# Patient Record
Sex: FEMALE | Race: WHITE | URBAN - NONMETROPOLITAN AREA
[De-identification: names, ages, dates, MRNs, and addresses within clinical notes are randomized per-mention and may not be internally consistent; named-entity substitution may affect disease eponyms.]

---

## 2020-07-02 ENCOUNTER — OFFICE VISIT (OUTPATIENT)
Age: 25
End: 2020-07-02

## 2020-07-02 NOTE — PROGRESS NOTES
Patient swabbed for COVID-19 using [employer] vendor but was not evaluated inside the clinic. Patient specimen collected in drive through in patients private vehicle due to order present from primary care provider. Patient was not evaluated by flu clinic provider.

## 2020-07-06 LAB
REPORT: NORMAL
SARS-COV-2: NOT DETECTED
THIS TEST SENT TO: NORMAL

## 2022-09-06 ENCOUNTER — HOSPITAL ENCOUNTER (EMERGENCY)
Facility: HOSPITAL | Age: 27
Discharge: HOME | End: 2022-09-06
Admitting: EMERGENCY MEDICINE

## 2022-09-06 VITALS
TEMPERATURE: 98.4 F | HEIGHT: 63 IN | RESPIRATION RATE: 16 BRPM | HEART RATE: 84 BPM | WEIGHT: 118 LBS | BODY MASS INDEX: 20.91 KG/M2 | SYSTOLIC BLOOD PRESSURE: 116 MMHG | OXYGEN SATURATION: 100 % | DIASTOLIC BLOOD PRESSURE: 70 MMHG

## 2022-09-06 DIAGNOSIS — F41.0 ANXIETY ATTACK: Primary | ICD-10-CM

## 2022-09-06 DIAGNOSIS — Z86.59 HISTORY OF DEPRESSION: ICD-10-CM

## 2022-09-06 LAB
B-HCG UR QL: NEGATIVE
EXPIRATION DATE: NORMAL
INTERNAL NEGATIVE CONTROL: NEGATIVE
INTERNAL POSITIVE CONTROL: POSITIVE
Lab: NORMAL

## 2022-09-06 PROCEDURE — 63710000001 ONDANSETRON ODT 4 MG TABLET DISPERSIBLE: Performed by: NURSE PRACTITIONER

## 2022-09-06 PROCEDURE — 81025 URINE PREGNANCY TEST: CPT | Performed by: NURSE PRACTITIONER

## 2022-09-06 PROCEDURE — 99283 EMERGENCY DEPT VISIT LOW MDM: CPT

## 2022-09-06 RX ORDER — BUSPIRONE HYDROCHLORIDE 5 MG/1
5 TABLET ORAL 3 TIMES DAILY
Qty: 30 TABLET | Refills: 0 | Status: SHIPPED | OUTPATIENT
Start: 2022-09-06

## 2022-09-06 RX ORDER — DIPHENHYDRAMINE HCL 25 MG
25 CAPSULE ORAL DAILY
COMMUNITY
End: 2022-10-25

## 2022-09-06 RX ORDER — ACETAMINOPHEN 500 MG
1000 TABLET ORAL ONCE
Status: COMPLETED | OUTPATIENT
Start: 2022-09-06 | End: 2022-09-06

## 2022-09-06 RX ORDER — LORAZEPAM 0.5 MG/1
0.5 TABLET ORAL ONCE
Status: COMPLETED | OUTPATIENT
Start: 2022-09-06 | End: 2022-09-06

## 2022-09-06 RX ORDER — ONDANSETRON 4 MG/1
4 TABLET, ORALLY DISINTEGRATING ORAL ONCE
Status: COMPLETED | OUTPATIENT
Start: 2022-09-06 | End: 2022-09-06

## 2022-09-06 RX ADMIN — ONDANSETRON 4 MG: 4 TABLET, ORALLY DISINTEGRATING ORAL at 16:32

## 2022-09-06 RX ADMIN — ACETAMINOPHEN 1000 MG: 500 TABLET ORAL at 16:32

## 2022-09-06 RX ADMIN — LORAZEPAM 0.5 MG: 0.5 TABLET ORAL at 16:31

## 2022-09-06 NOTE — DISCHARGE INSTRUCTIONS
Follow up with pcp for re-evaluation and who may also need to prescribe anti-depression medication and anxiety medication. List provided.    Decrease life stressors; call for outpatient counseling- [facility] who has outpatient clinic and counseling services [phone]    Follow up with one of the [facility] physician groups below to setup primary care. If you have trouble making an appointment, please call the [facility] Nurse Line at [phone]    [facility] - [place]  [address] [place] [address]  [id number] [phone]    [facility] Internal Medicine - [address] [address], [address], [place], [place] [id number] [phone]    [facility] Family [facility] Internal [facility] - [address] [address], [address], [place], [place] [id number] [phone]     [facility] ([facility]) - [place]  [address], [address], [place], [place] [id number] [phone]    [facility] - [address], [address] [phone]    [facility] Family [facility] - [address], [place] [address] [phone]    [facility] - [place]  [address] [place], [place], [address] [phone]    [facility] - [place]  [address], [address]  [phone]    [facility] - [place]  [address] [address] [phone]    [facility] - [place]  [address] [place] [address], [facility], [place], [place], [address] [phone]        PEDIATRIC:    [facility] Pediatrics - [address] [address], [address], [place], [place] [id number] [phone]
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
## 2022-10-25 ENCOUNTER — APPOINTMENT (OUTPATIENT)
Dept: GENERAL RADIOLOGY | Facility: HOSPITAL | Age: 27
End: 2022-10-25

## 2022-10-25 ENCOUNTER — HOSPITAL ENCOUNTER (EMERGENCY)
Facility: HOSPITAL | Age: 27
Discharge: HOME | End: 2022-10-25
Attending: EMERGENCY MEDICINE | Admitting: EMERGENCY MEDICINE

## 2022-10-25 VITALS
BODY MASS INDEX: 20.2 KG/M2 | SYSTOLIC BLOOD PRESSURE: 107 MMHG | HEART RATE: 64 BPM | HEIGHT: 63 IN | TEMPERATURE: 99 F | DIASTOLIC BLOOD PRESSURE: 70 MMHG | RESPIRATION RATE: 18 BRPM | WEIGHT: 114 LBS | OXYGEN SATURATION: 100 %

## 2022-10-25 DIAGNOSIS — J20.9 ACUTE BRONCHITIS, UNSPECIFIED ORGANISM: Primary | ICD-10-CM

## 2022-10-25 DIAGNOSIS — J45.909 REACTIVE AIRWAY DISEASE WITHOUT COMPLICATION, UNSPECIFIED ASTHMA SEVERITY, UNSPECIFIED WHETHER PERSISTENT: ICD-10-CM

## 2022-10-25 LAB
ANION GAP SERPL CALCULATED.3IONS-SCNC: 12 MMOL/L (ref 5–15)
BASOPHILS # BLD AUTO: 0.05 10*3/MM3 (ref 0–0.2)
BASOPHILS NFR BLD AUTO: 0.8 % (ref 0–1.5)
BUN SERPL-MCNC: 9 MG/DL (ref 6–20)
BUN/CREAT SERPL: 13.8 (ref 7–25)
CALCIUM SPEC-SCNC: 9.2 MG/DL (ref 8.6–10.5)
CHLORIDE SERPL-SCNC: 104 MMOL/L (ref 98–107)
CO2 SERPL-SCNC: 24 MMOL/L (ref 22–29)
CREAT SERPL-MCNC: 0.65 MG/DL (ref 0.57–1)
CRP SERPL-MCNC: <0.3 MG/DL (ref 0–0.5)
D DIMER PPP FEU-MCNC: 0.4 MCGFEU/ML (ref 0–0.5)
DEPRECATED RDW RBC AUTO: 42.1 FL (ref 37–54)
EGFRCR SERPLBLD CKD-EPI 2021: 125.5 ML/MIN/1.73
EOSINOPHIL # BLD AUTO: 0.18 10*3/MM3 (ref 0–0.4)
EOSINOPHIL NFR BLD AUTO: 3 % (ref 0.3–6.2)
ERYTHROCYTE [DISTWIDTH] IN BLOOD BY AUTOMATED COUNT: 12.9 % (ref 12.3–15.4)
GLUCOSE SERPL-MCNC: 89 MG/DL (ref 65–99)
HCG SERPL QL: NEGATIVE
HCT VFR BLD AUTO: 38.9 % (ref 34–46.6)
HGB BLD-MCNC: 13 G/DL (ref 12–15.9)
IMM GRANULOCYTES # BLD AUTO: 0.01 10*3/MM3 (ref 0–0.05)
IMM GRANULOCYTES NFR BLD AUTO: 0.2 % (ref 0–0.5)
LYMPHOCYTES # BLD AUTO: 1.83 10*3/MM3 (ref 0.7–3.1)
LYMPHOCYTES NFR BLD AUTO: 30 % (ref 19.6–45.3)
MCH RBC QN AUTO: 29.5 PG (ref 26.6–33)
MCHC RBC AUTO-ENTMCNC: 33.4 G/DL (ref 31.5–35.7)
MCV RBC AUTO: 88.4 FL (ref 79–97)
MONOCYTES # BLD AUTO: 0.39 10*3/MM3 (ref 0.1–0.9)
MONOCYTES NFR BLD AUTO: 6.4 % (ref 5–12)
NEUTROPHILS NFR BLD AUTO: 3.63 10*3/MM3 (ref 1.7–7)
NEUTROPHILS NFR BLD AUTO: 59.6 % (ref 42.7–76)
NRBC BLD AUTO-RTO: 0 /100 WBC (ref 0–0.2)
NT-PROBNP SERPL-MCNC: 118.3 PG/ML (ref 0–450)
PLATELET # BLD AUTO: 205 10*3/MM3 (ref 140–450)
PMV BLD AUTO: 11 FL (ref 6–12)
POTASSIUM SERPL-SCNC: 3.5 MMOL/L (ref 3.5–5.2)
PROCALCITONIN SERPL-MCNC: 0.03 NG/ML (ref 0–0.25)
RBC # BLD AUTO: 4.4 10*6/MM3 (ref 3.77–5.28)
SARS-COV-2 RNA RESP QL NAA+PROBE: NOT DETECTED
SODIUM SERPL-SCNC: 140 MMOL/L (ref 136–145)
WBC NRBC COR # BLD: 6.09 10*3/MM3 (ref 3.4–10.8)

## 2022-10-25 PROCEDURE — 94664 DEMO&/EVAL PT USE INHALER: CPT

## 2022-10-25 PROCEDURE — 93010 ELECTROCARDIOGRAM REPORT: CPT | Performed by: INTERNAL MEDICINE

## 2022-10-25 PROCEDURE — 85379 FIBRIN DEGRADATION QUANT: CPT | Performed by: EMERGENCY MEDICINE

## 2022-10-25 PROCEDURE — 85025 COMPLETE CBC W/AUTO DIFF WBC: CPT | Performed by: EMERGENCY MEDICINE

## 2022-10-25 PROCEDURE — 84703 CHORIONIC GONADOTROPIN ASSAY: CPT | Performed by: EMERGENCY MEDICINE

## 2022-10-25 PROCEDURE — U0003 INFECTIOUS AGENT DETECTION BY NUCLEIC ACID (DNA OR RNA); SEVERE ACUTE RESPIRATORY SYNDROME CORONAVIRUS 2 (SARS-COV-2) (CORONAVIRUS DISEASE [COVID-19]), AMPLIFIED PROBE TECHNIQUE, MAKING USE OF HIGH THROUGHPUT TECHNOLOGIES AS DESCRIBED BY CMS-2020-01-R: HCPCS | Performed by: EMERGENCY MEDICINE

## 2022-10-25 PROCEDURE — 93005 ELECTROCARDIOGRAM TRACING: CPT

## 2022-10-25 PROCEDURE — 36415 COLL VENOUS BLD VENIPUNCTURE: CPT

## 2022-10-25 PROCEDURE — 96374 THER/PROPH/DIAG INJ IV PUSH: CPT

## 2022-10-25 PROCEDURE — 80048 BASIC METABOLIC PNL TOTAL CA: CPT | Performed by: EMERGENCY MEDICINE

## 2022-10-25 PROCEDURE — 86140 C-REACTIVE PROTEIN: CPT | Performed by: EMERGENCY MEDICINE

## 2022-10-25 PROCEDURE — 83880 ASSAY OF NATRIURETIC PEPTIDE: CPT | Performed by: EMERGENCY MEDICINE

## 2022-10-25 PROCEDURE — 93005 ELECTROCARDIOGRAM TRACING: CPT | Performed by: EMERGENCY MEDICINE

## 2022-10-25 PROCEDURE — 25010000002 METHYLPREDNISOLONE PER 125 MG: Performed by: EMERGENCY MEDICINE

## 2022-10-25 PROCEDURE — 99284 EMERGENCY DEPT VISIT MOD MDM: CPT

## 2022-10-25 PROCEDURE — 94640 AIRWAY INHALATION TREATMENT: CPT

## 2022-10-25 PROCEDURE — 84145 PROCALCITONIN (PCT): CPT | Performed by: EMERGENCY MEDICINE

## 2022-10-25 PROCEDURE — 71045 X-RAY EXAM CHEST 1 VIEW: CPT

## 2022-10-25 PROCEDURE — 94799 UNLISTED PULMONARY SVC/PX: CPT

## 2022-10-25 RX ORDER — METHYLPREDNISOLONE SODIUM SUCCINATE 125 MG/2ML
125 INJECTION, POWDER, LYOPHILIZED, FOR SOLUTION INTRAMUSCULAR; INTRAVENOUS ONCE
Status: COMPLETED | OUTPATIENT
Start: 2022-10-25 | End: 2022-10-25

## 2022-10-25 RX ORDER — ACETAMINOPHEN 500 MG
1000 TABLET ORAL ONCE
Status: COMPLETED | OUTPATIENT
Start: 2022-10-25 | End: 2022-10-25

## 2022-10-25 RX ORDER — ALBUTEROL SULFATE 90 UG/1
2 AEROSOL, METERED RESPIRATORY (INHALATION) EVERY 4 HOURS PRN
Qty: 1 G | Refills: 0 | Status: SHIPPED | OUTPATIENT
Start: 2022-10-25 | End: 2022-10-26 | Stop reason: SDUPTHER

## 2022-10-25 RX ORDER — METHYLPREDNISOLONE 4 MG/1
TABLET ORAL
Qty: 21 TABLET | Refills: 0 | Status: SHIPPED | OUTPATIENT
Start: 2022-10-25 | End: 2022-10-26 | Stop reason: SDUPTHER

## 2022-10-25 RX ORDER — IPRATROPIUM BROMIDE AND ALBUTEROL SULFATE 2.5; .5 MG/3ML; MG/3ML
3 SOLUTION RESPIRATORY (INHALATION) ONCE
Status: COMPLETED | OUTPATIENT
Start: 2022-10-25 | End: 2022-10-25

## 2022-10-25 RX ADMIN — ACETAMINOPHEN 1000 MG: 500 TABLET ORAL at 02:02

## 2022-10-25 RX ADMIN — IPRATROPIUM BROMIDE AND ALBUTEROL SULFATE 3 ML: 2.5; .5 SOLUTION RESPIRATORY (INHALATION) at 01:25

## 2022-10-25 RX ADMIN — METHYLPREDNISOLONE SODIUM SUCCINATE 125 MG: 125 INJECTION, POWDER, FOR SOLUTION INTRAMUSCULAR; INTRAVENOUS at 02:02

## 2022-10-26 LAB
QT INTERVAL: 352 MS
QTC INTERVAL: 432 MS

## 2022-10-26 RX ORDER — METHYLPREDNISOLONE 4 MG/1
TABLET ORAL
Qty: 21 TABLET | Refills: 0 | Status: SHIPPED | OUTPATIENT
Start: 2022-10-26

## 2022-10-26 RX ORDER — ALBUTEROL SULFATE 90 UG/1
2 AEROSOL, METERED RESPIRATORY (INHALATION) EVERY 4 HOURS PRN
Qty: 1 G | Refills: 0 | Status: SHIPPED | OUTPATIENT
Start: 2022-10-26

## 2023-03-21 ENCOUNTER — APPOINTMENT (OUTPATIENT)
Dept: GENERAL RADIOLOGY | Facility: HOSPITAL | Age: 28
End: 2023-03-21
Payer: COMMERCIAL

## 2023-03-21 ENCOUNTER — HOSPITAL ENCOUNTER (EMERGENCY)
Facility: HOSPITAL | Age: 28
Discharge: HOME | End: 2023-03-21
Admitting: EMERGENCY MEDICINE
Payer: COMMERCIAL

## 2023-03-21 VITALS
WEIGHT: 114 LBS | DIASTOLIC BLOOD PRESSURE: 75 MMHG | TEMPERATURE: 98.7 F | OXYGEN SATURATION: 100 % | SYSTOLIC BLOOD PRESSURE: 120 MMHG | HEART RATE: 80 BPM | RESPIRATION RATE: 22 BRPM | HEIGHT: 63 IN | BODY MASS INDEX: 20.2 KG/M2

## 2023-03-21 DIAGNOSIS — J18.9 PNEUMONITIS: ICD-10-CM

## 2023-03-21 DIAGNOSIS — U07.1 COVID-19: Primary | ICD-10-CM

## 2023-03-21 LAB
FLUAV RNA RESP QL NAA+PROBE: NOT DETECTED
FLUBV RNA RESP QL NAA+PROBE: NOT DETECTED
SARS-COV-2 RNA RESP QL NAA+PROBE: DETECTED

## 2023-03-21 PROCEDURE — 93010 ELECTROCARDIOGRAM REPORT: CPT | Performed by: HOSPITALIST

## 2023-03-21 PROCEDURE — 99283 EMERGENCY DEPT VISIT LOW MDM: CPT

## 2023-03-21 PROCEDURE — 71045 X-RAY EXAM CHEST 1 VIEW: CPT

## 2023-03-21 PROCEDURE — 93005 ELECTROCARDIOGRAM TRACING: CPT

## 2023-03-21 PROCEDURE — 87636 SARSCOV2 & INF A&B AMP PRB: CPT

## 2023-03-21 NOTE — ED PROVIDER NOTES
Subjective   History of Present Illness  Patient is a 27-year-old female who presents to the ED today with complaint of cough for the last week, reports over the last 2 days her cough has become worse and she is having headache and body aches with this as well.  Reports she took an at home COVID test which was positive, but faint.  She reports she has history of pneumonia and mainly came to the hospital today to make sure she does not have a pneumonia on top of this.  She is tachycardic at 114 bpm, O2 sat is 100% on room air.  She denies any chest pain, dizziness, palpitations, or syncope.  There is no PMH.    History provided by:  Patient   used: No        Review of Systems   Constitutional: Positive for fatigue. Negative for chills, diaphoresis and fever.   HENT: Negative for congestion, sore throat and trouble swallowing.    Eyes: Negative.    Respiratory: Positive for cough. Negative for shortness of breath and wheezing.    Cardiovascular: Negative for chest pain, palpitations and leg swelling.   Gastrointestinal: Negative for abdominal pain, nausea and vomiting.   Genitourinary: Negative.    Musculoskeletal: Positive for myalgias.   Skin: Negative.    Neurological: Positive for headaches. Negative for dizziness, syncope, weakness and numbness.   Psychiatric/Behavioral: Negative.        No past medical history on file.    No Known Allergies    Past Surgical History:   Procedure Laterality Date   • CHOLECYSTECTOMY         No family history on file.    Social History     Socioeconomic History   • Marital status: Single   Tobacco Use   • Smoking status: Some Days   Vaping Use   • Vaping Use: Every day   Substance and Sexual Activity   • Alcohol use: Never   • Drug use: Yes     Types: Marijuana   • Sexual activity: Defer           Objective   Physical Exam  Vitals and nursing note reviewed.   Constitutional:       General: She is not in acute distress.     Appearance: Normal appearance. She is  not toxic-appearing or diaphoretic.   HENT:      Head: Normocephalic and atraumatic.      Right Ear: External ear normal.      Left Ear: External ear normal.      Nose: Nose normal.   Eyes:      Extraocular Movements: Extraocular movements intact.      Conjunctiva/sclera: Conjunctivae normal.      Pupils: Pupils are equal, round, and reactive to light.   Cardiovascular:      Rate and Rhythm: Regular rhythm. Tachycardia present.      Pulses: Normal pulses.      Heart sounds: Normal heart sounds.   Pulmonary:      Effort: Pulmonary effort is normal. No respiratory distress.      Breath sounds: Normal breath sounds. No stridor. No wheezing, rhonchi or rales.      Comments: Lung sounds clear bilaterally  Musculoskeletal:      Cervical back: Normal range of motion.   Skin:     General: Skin is warm and dry.      Capillary Refill: Capillary refill takes less than 2 seconds.   Neurological:      Mental Status: She is alert and oriented to person, place, and time. Mental status is at baseline.      GCS: GCS eye subscore is 4. GCS verbal subscore is 5. GCS motor subscore is 6.   Psychiatric:         Mood and Affect: Mood normal.         Behavior: Behavior normal.         Thought Content: Thought content normal.         Judgment: Judgment normal.       XR Chest 1 View   Final Result   1. Peribronchial thickening. Hazy medial bibasilar densities may relate   to atelectasis versus pneumonitis.   This report was finalized on 03/21/2023 14:56 by Dr. Jessica Castro MD.        Labs Reviewed   COVID-19 AND FLU A/B, NP SWAB IN TRANSPORT MEDIA 8-12 HR TAT - Abnormal; Notable for the following components:       Result Value    COVID19 Detected (*)     All other components within normal limits    Narrative:     Fact sheet for providers: https://www.fda.gov/media/048024/download    Fact sheet for patients: https://www.fda.gov/media/385626/download    Test performed by PCR.   COVID PRE-OP / PRE-PROCEDURE SCREENING ORDER (NO ISOLATION)     Narrative:     The following orders were created for panel order COVID PRE-OP / PRE-PROCEDURE SCREENING ORDER (NO ISOLATION) - Swab, Nasopharynx.  Procedure                               Abnormality         Status                     ---------                               -----------         ------                     COVID-19 and FLU A/B PCR...[[id number]]  Abnormal            Final result                 Please view results for these tests on the individual orders.       Procedures           ED Course  ED Course as of 03/21/23 2308   Tue Mar 21, 2023   1531 EKG shows NSR 72 bpm. [HE]      ED Course User Index  [HE] Dory Faustin APRN                                           Medical Decision Making  Patient is a 27-year-old female who presents to the ED today with complaint of cough for the last week, reports over the last 2 days her cough has become worse and she is having headache and body aches with this as well.  Reports she took an at home COVID test which was positive, but faint.  She reports she has history of pneumonia and mainly came to the hospital today to make sure she does not have a pneumonia on top of this.  She is tachycardic at 114 bpm, O2 sat is 100% on room air.  She denies any chest pain, dizziness, palpitations, or syncope.  There is no PMH.    CXR reveals peribronchial thickening. Hazy medial bibasilar densities may relate to atelectasis versus pneumonitis.  EKG shows NSR 72bpm.   Covid test is positive, pt not requiring O2, 100% ambulatory O2 sat on room air.   Pt to f/u with her PCP, return precautions given as well as discharge information regarding managing covid at home. Pt agreeable and appreciative. VS reassuring, HR has improved to 80bpm, pt discharged in stable condition.     COVID-19: acute illness or injury  Amount and/or Complexity of Data Reviewed  Radiology: ordered.  ECG/medicine tests: ordered.          Final diagnoses:   COVID-19   Pneumonitis       ED Disposition  ED  Disposition     ED Disposition   Discharge    Condition   Stable    Comment   --             [facility] [address]  [phone]  In 3 days           Medication List      No changes were made to your prescriptions during this visit.          Dory Faustin, APRN  03/21/23 9926

## 2023-03-21 NOTE — DISCHARGE INSTRUCTIONS
Your COVID test is positive today, your chest x-ray does not show any obvious pneumonia but there is inflammation of your lungs.  Please make sure you follow-up with your PCP and return to the ED with any new, worsening, or persistent symptoms especially low oxygen saturation at home.

## 2023-03-23 LAB
QT INTERVAL: 390 MS
QTC INTERVAL: 427 MS

## 2024-04-19 ENCOUNTER — HOSPITAL ENCOUNTER (EMERGENCY)
Facility: HOSPITAL | Age: 29
Discharge: HOME | End: 2024-04-19
Payer: COMMERCIAL

## 2024-04-19 VITALS
BODY MASS INDEX: 22.24 KG/M2 | OXYGEN SATURATION: 98 % | DIASTOLIC BLOOD PRESSURE: 74 MMHG | HEIGHT: 63 IN | HEART RATE: 89 BPM | RESPIRATION RATE: 16 BRPM | SYSTOLIC BLOOD PRESSURE: 114 MMHG | WEIGHT: 125.5 LBS | TEMPERATURE: 98.5 F

## 2024-04-19 DIAGNOSIS — R11.2 NAUSEA VOMITING AND DIARRHEA: Primary | ICD-10-CM

## 2024-04-19 DIAGNOSIS — R19.7 NAUSEA VOMITING AND DIARRHEA: Primary | ICD-10-CM

## 2024-04-19 LAB
ALBUMIN SERPL-MCNC: 4.8 G/DL (ref 3.5–5.2)
ALBUMIN/GLOB SERPL: 1.8 G/DL
ALP SERPL-CCNC: 70 U/L (ref 39–117)
ALT SERPL W P-5'-P-CCNC: 14 U/L (ref 1–33)
ANION GAP SERPL CALCULATED.3IONS-SCNC: 12 MMOL/L (ref 5–15)
AST SERPL-CCNC: 20 U/L (ref 1–32)
BASOPHILS # BLD AUTO: 0.07 10*3/MM3 (ref 0–0.2)
BASOPHILS NFR BLD AUTO: 0.9 % (ref 0–1.5)
BILIRUB SERPL-MCNC: 0.3 MG/DL (ref 0–1.2)
BILIRUB UR QL STRIP: NEGATIVE
BUN SERPL-MCNC: 8 MG/DL (ref 6–20)
BUN/CREAT SERPL: 12.1 (ref 7–25)
CALCIUM SPEC-SCNC: 9.3 MG/DL (ref 8.6–10.5)
CHLORIDE SERPL-SCNC: 105 MMOL/L (ref 98–107)
CLARITY UR: ABNORMAL
CO2 SERPL-SCNC: 23 MMOL/L (ref 22–29)
COLOR UR: YELLOW
CREAT SERPL-MCNC: 0.66 MG/DL (ref 0.57–1)
DEPRECATED RDW RBC AUTO: 40.7 FL (ref 37–54)
EGFRCR SERPLBLD CKD-EPI 2021: 123.5 ML/MIN/1.73
EOSINOPHIL # BLD AUTO: 0.08 10*3/MM3 (ref 0–0.4)
EOSINOPHIL NFR BLD AUTO: 1 % (ref 0.3–6.2)
ERYTHROCYTE [DISTWIDTH] IN BLOOD BY AUTOMATED COUNT: 12.4 % (ref 12.3–15.4)
GLOBULIN UR ELPH-MCNC: 2.6 GM/DL
GLUCOSE SERPL-MCNC: 90 MG/DL (ref 65–99)
GLUCOSE UR STRIP-MCNC: NEGATIVE MG/DL
HCG INTACT+B SERPL-ACNC: <0.1 MIU/ML
HCT VFR BLD AUTO: 41.1 % (ref 34–46.6)
HGB BLD-MCNC: 13.7 G/DL (ref 12–15.9)
HGB UR QL STRIP.AUTO: NEGATIVE
IMM GRANULOCYTES # BLD AUTO: 0.02 10*3/MM3 (ref 0–0.05)
IMM GRANULOCYTES NFR BLD AUTO: 0.3 % (ref 0–0.5)
KETONES UR QL STRIP: ABNORMAL
LEUKOCYTE ESTERASE UR QL STRIP.AUTO: NEGATIVE
LIPASE SERPL-CCNC: 15 U/L (ref 13–60)
LYMPHOCYTES # BLD AUTO: 1.53 10*3/MM3 (ref 0.7–3.1)
LYMPHOCYTES NFR BLD AUTO: 19.3 % (ref 19.6–45.3)
MCH RBC QN AUTO: 29.8 PG (ref 26.6–33)
MCHC RBC AUTO-ENTMCNC: 33.3 G/DL (ref 31.5–35.7)
MCV RBC AUTO: 89.3 FL (ref 79–97)
MONOCYTES # BLD AUTO: 0.38 10*3/MM3 (ref 0.1–0.9)
MONOCYTES NFR BLD AUTO: 4.8 % (ref 5–12)
NEUTROPHILS NFR BLD AUTO: 5.83 10*3/MM3 (ref 1.7–7)
NEUTROPHILS NFR BLD AUTO: 73.7 % (ref 42.7–76)
NITRITE UR QL STRIP: NEGATIVE
NRBC BLD AUTO-RTO: 0 /100 WBC (ref 0–0.2)
PH UR STRIP.AUTO: 6 [PH] (ref 5–8)
PLATELET # BLD AUTO: 214 10*3/MM3 (ref 140–450)
PMV BLD AUTO: 11.1 FL (ref 6–12)
POTASSIUM SERPL-SCNC: 3.7 MMOL/L (ref 3.5–5.2)
PROT SERPL-MCNC: 7.4 G/DL (ref 6–8.5)
PROT UR QL STRIP: ABNORMAL
RBC # BLD AUTO: 4.6 10*6/MM3 (ref 3.77–5.28)
SODIUM SERPL-SCNC: 140 MMOL/L (ref 136–145)
SP GR UR STRIP: 1.02 (ref 1–1.03)
UROBILINOGEN UR QL STRIP: ABNORMAL
WBC NRBC COR # BLD AUTO: 7.91 10*3/MM3 (ref 3.4–10.8)

## 2024-04-19 PROCEDURE — 96376 TX/PRO/DX INJ SAME DRUG ADON: CPT

## 2024-04-19 PROCEDURE — 80053 COMPREHEN METABOLIC PANEL: CPT | Performed by: PHYSICIAN ASSISTANT

## 2024-04-19 PROCEDURE — 25010000002 ONDANSETRON PER 1 MG: Performed by: PHYSICIAN ASSISTANT

## 2024-04-19 PROCEDURE — 85025 COMPLETE CBC W/AUTO DIFF WBC: CPT | Performed by: PHYSICIAN ASSISTANT

## 2024-04-19 PROCEDURE — 25810000003 LACTATED RINGERS SOLUTION: Performed by: PHYSICIAN ASSISTANT

## 2024-04-19 PROCEDURE — 84702 CHORIONIC GONADOTROPIN TEST: CPT | Performed by: PHYSICIAN ASSISTANT

## 2024-04-19 PROCEDURE — 83690 ASSAY OF LIPASE: CPT | Performed by: PHYSICIAN ASSISTANT

## 2024-04-19 PROCEDURE — 96361 HYDRATE IV INFUSION ADD-ON: CPT

## 2024-04-19 PROCEDURE — 96374 THER/PROPH/DIAG INJ IV PUSH: CPT

## 2024-04-19 PROCEDURE — 25810000003 LACTATED RINGERS PER 1000 ML: Performed by: PHYSICIAN ASSISTANT

## 2024-04-19 PROCEDURE — 81003 URINALYSIS AUTO W/O SCOPE: CPT | Performed by: PHYSICIAN ASSISTANT

## 2024-04-19 PROCEDURE — 99283 EMERGENCY DEPT VISIT LOW MDM: CPT

## 2024-04-19 RX ORDER — SODIUM CHLORIDE, SODIUM LACTATE, POTASSIUM CHLORIDE, CALCIUM CHLORIDE 600; 310; 30; 20 MG/100ML; MG/100ML; MG/100ML; MG/100ML
125 INJECTION, SOLUTION INTRAVENOUS CONTINUOUS
Status: DISCONTINUED | OUTPATIENT
Start: 2024-04-19 | End: 2024-04-19 | Stop reason: HOSPADM

## 2024-04-19 RX ORDER — ONDANSETRON 2 MG/ML
4 INJECTION INTRAMUSCULAR; INTRAVENOUS ONCE
Status: COMPLETED | OUTPATIENT
Start: 2024-04-19 | End: 2024-04-19

## 2024-04-19 RX ORDER — ALUMINA, MAGNESIA, AND SIMETHICONE 2400; 2400; 240 MG/30ML; MG/30ML; MG/30ML
5 SUSPENSION ORAL ONCE
Status: COMPLETED | OUTPATIENT
Start: 2024-04-19 | End: 2024-04-19

## 2024-04-19 RX ORDER — DOXYLAMINE SUCCINATE AND PYRIDOXINE HYDROCHLORIDE, DELAYED RELEASE TABLETS 10 MG/10 MG 10; 10 MG/1; MG/1
2 TABLET, DELAYED RELEASE ORAL NIGHTLY PRN
Qty: 12 TABLET | Refills: 0 | Status: SHIPPED | OUTPATIENT
Start: 2024-04-19

## 2024-04-19 RX ORDER — ACETAMINOPHEN 500 MG
1000 TABLET ORAL ONCE
Status: COMPLETED | OUTPATIENT
Start: 2024-04-19 | End: 2024-04-19

## 2024-04-19 RX ORDER — ONDANSETRON 4 MG/1
4 TABLET, ORALLY DISINTEGRATING ORAL EVERY 6 HOURS PRN
Qty: 20 TABLET | Refills: 0 | Status: SHIPPED | OUTPATIENT
Start: 2024-04-19

## 2024-04-19 RX ADMIN — DOXYLAMINE SUCCINATE: 25 TABLET ORAL at 16:17

## 2024-04-19 RX ADMIN — SODIUM CHLORIDE, POTASSIUM CHLORIDE, SODIUM LACTATE AND CALCIUM CHLORIDE 1000 ML: 600; 310; 30; 20 INJECTION, SOLUTION INTRAVENOUS at 15:46

## 2024-04-19 RX ADMIN — SODIUM CHLORIDE, POTASSIUM CHLORIDE, SODIUM LACTATE AND CALCIUM CHLORIDE 1000 ML: 600; 310; 30; 20 INJECTION, SOLUTION INTRAVENOUS at 14:52

## 2024-04-19 RX ADMIN — ALUMINUM HYDROXIDE, MAGNESIUM HYDROXIDE, AND DIMETHICONE 5 ML: 400; 400; 40 SUSPENSION ORAL at 15:46

## 2024-04-19 RX ADMIN — ACETAMINOPHEN 1000 MG: 500 TABLET, FILM COATED ORAL at 15:44

## 2024-04-19 RX ADMIN — SODIUM CHLORIDE, POTASSIUM CHLORIDE, SODIUM LACTATE AND CALCIUM CHLORIDE 125 ML/HR: 600; 310; 30; 20 INJECTION, SOLUTION INTRAVENOUS at 15:46

## 2024-04-19 RX ADMIN — SODIUM CHLORIDE, POTASSIUM CHLORIDE, SODIUM LACTATE AND CALCIUM CHLORIDE 1000 ML: 600; 310; 30; 20 INJECTION, SOLUTION INTRAVENOUS at 17:24

## 2024-04-19 RX ADMIN — ONDANSETRON 4 MG: 2 INJECTION INTRAMUSCULAR; INTRAVENOUS at 14:51

## 2024-04-19 RX ADMIN — ONDANSETRON 4 MG: 2 INJECTION INTRAMUSCULAR; INTRAVENOUS at 15:46

## 2024-04-19 NOTE — ED PROVIDER NOTES
Subjective   History of Present Illness    Patient is a pleasant 28-year-old female chief complaint of persistent vomiting and diarrhea.  The patient describes as occurred 3 days ago soon after she had eaten a burger at where she works.  She does not believe it is food poisoning.  She developed and persistent vomiting with 3 episodes of loose stools in the past 72 hours.  Described she is vomiting way more.  She has severe abdominal cramping post emesis.  She has tried Tylenol which has not helped with the pain but did help with her subjective fever.  She tried drinking water today but has not been able to keep anything down.  She feels like she is dehydrated.  Her last urinary output was approximately 9 AM.    Patient denies any recent sick exposure.  She denies any food poisoning.  She questions whether or not she is pregnant.  Denies abnormal vaginal bleeding or discharge that she is aware of.  She denies being immunocompromised.  She denies being diabetic.  She is not on any medications nor has any medical allergies.    Review of Systems   Constitutional:  Positive for activity change. Negative for fever.   HENT: Negative.     Respiratory: Negative.     Cardiovascular: Negative.    Gastrointestinal:  Positive for abdominal pain, diarrhea, nausea and vomiting.   Genitourinary: Negative.    Musculoskeletal: Negative.    Neurological: Negative.    Psychiatric/Behavioral: Negative.     All other systems reviewed and are negative.      Past Medical History:   Diagnosis Date    Patient denies medical problems        No Known Allergies    Past Surgical History:   Procedure Laterality Date    CHOLECYSTECTOMY         History reviewed. No pertinent family history.    Social History     Socioeconomic History    Marital status: Significant Other   Tobacco Use    Smoking status: Former     Types: Cigarettes   Vaping Use    Vaping status: Every Day   Substance and Sexual Activity    Alcohol use: Not Currently    Drug use: Yes  "    Types: Marijuana    Sexual activity: Defer       Prior to Admission medications    Medication Sig Start Date End Date Taking? Authorizing Provider   albuterol sulfate  (90 Base) MCG/ACT inhaler Inhale 2 puffs Every 4 (Four) Hours As Needed for Wheezing. 10/26/22   Miguelangel Hernandez MD   busPIRone (BUSPAR) 5 MG tablet Take 1 tablet by mouth 3 (Three) Times a Day. Prn anxiety 9/6/22   Nina Palma APRN   methylPREDNISolone (MEDROL) 4 MG dose pack Take as directed on package instructions. 10/26/22   Miguelangel Hernandez MD       Medications   lactated ringers infusion (125 mL/hr Intravenous New Bag 4/19/24 1546)   lactated ringers bolus 1,000 mL (0 mL Intravenous Stopped 4/19/24 1547)   ondansetron (ZOFRAN) injection 4 mg (4 mg Intravenous Given 4/19/24 1451)   lactated ringers bolus 1,000 mL (0 mL Intravenous Stopped 4/19/24 1700)   ondansetron (ZOFRAN) injection 4 mg (4 mg Intravenous Given 4/19/24 1546)   aluminum-magnesium hydroxide-simethicone (MAALOX MAX) 400-400-40 MG/5ML suspension 5 mL (5 mL Oral Given 4/19/24 1546)   acetaminophen (TYLENOL) tablet 1,000 mg (1,000 mg Oral Given 4/19/24 1544)   doxylamine-pyridoxine 25-25 mg combo dose ( Oral Given 4/19/24 1617)   lactated ringers bolus 1,000 mL (1,000 mL Intravenous New Bag 4/19/24 1724)       /59   Pulse 76   Temp 98.5 °F (36.9 °C) (Oral)   Resp 20   Ht 160 cm (63\")   Wt 56.9 kg (125 lb 8 oz)   LMP  (LMP Unknown)   SpO2 100%   BMI 22.23 kg/m²       Objective   Physical Exam  Vitals and nursing note reviewed.   Constitutional:       General: She is not in acute distress.     Appearance: She is well-developed. She is not diaphoretic.   HENT:      Head: Normocephalic and atraumatic.   Eyes:      Conjunctiva/sclera: Conjunctivae normal.      Pupils: Pupils are equal, round, and reactive to light.   Neck:      Trachea: No tracheal deviation.   Cardiovascular:      Rate and Rhythm: Normal rate and regular rhythm.      Heart sounds: Normal " heart sounds. No murmur heard.  Pulmonary:      Effort: Pulmonary effort is normal.      Breath sounds: Normal breath sounds.   Abdominal:      General: Bowel sounds are normal. There is no distension.      Palpations: Abdomen is soft. There is no mass.      Tenderness: There is no abdominal tenderness. There is no guarding or rebound.   Musculoskeletal:         General: Normal range of motion.      Cervical back: Normal range of motion and neck supple.   Skin:     General: Skin is warm and dry.      Capillary Refill: Capillary refill takes less than 2 seconds.   Neurological:      General: No focal deficit present.      Mental Status: She is alert and oriented to person, place, and time.   Psychiatric:         Mood and Affect: Mood normal.         Behavior: Behavior normal.         Thought Content: Thought content normal.         Judgment: Judgment normal.         Procedures         Lab Results (last 24 hours)       Procedure Component Value Units Date/Time    Urinalysis With Culture If Indicated - Urine, Clean Catch [[id number]]  (Abnormal) Collected: 04/19/24 1446    Specimen: Urine, Clean Catch Updated: 04/19/24 1503     Color, UA Yellow     Appearance, UA Cloudy     pH, UA 6.0     Specific Gravity, UA 1.022     Glucose, UA Negative     Ketones, UA Trace     Bilirubin, UA Negative     Blood, UA Negative     Protein, UA Trace     Leuk Esterase, UA Negative     Nitrite, UA Negative     Urobilinogen, UA 1.0 E.U./dL    Narrative:      In absence of clinical symptoms, the presence of pyuria, bacteria, and/or nitrites on the urinalysis result does not correlate with infection.  Urine microscopic not indicated.    CBC & Differential [[id number]]  (Abnormal) Collected: 04/19/24 1450    Specimen: Blood Updated: 04/19/24 1501    Narrative:      The following orders were created for panel order CBC & Differential.  Procedure                               Abnormality         Status                     ---------                                -----------         ------                     CBC Auto Differential[[id number]]        Abnormal            Final result                 Please view results for these tests on the individual orders.    Comprehensive Metabolic Panel [[id number]] Collected: 04/19/24 1450    Specimen: Blood Updated: 04/19/24 1520     Glucose 90 mg/dL      BUN 8 mg/dL      Creatinine 0.66 mg/dL      Sodium 140 mmol/L      Potassium 3.7 mmol/L      Chloride 105 mmol/L      CO2 23.0 mmol/L      Calcium 9.3 mg/dL      Total Protein 7.4 g/dL      Albumin 4.8 g/dL      ALT (SGPT) 14 U/L      AST (SGOT) 20 U/L      Alkaline Phosphatase 70 U/L      Total Bilirubin 0.3 mg/dL      Globulin 2.6 gm/dL      A/G Ratio 1.8 g/dL      BUN/Creatinine Ratio 12.1     Anion Gap 12.0 mmol/L      eGFR 123.5 mL/min/1.73     Narrative:      GFR Normal >60  Chronic Kidney Disease <60  Kidney Failure <15      Lipase [[id number]]  (Normal) Collected: 04/19/24 1450    Specimen: Blood Updated: 04/19/24 1515     Lipase 15 U/L     hCG, Quantitative, Pregnancy [[id number]] Collected: 04/19/24 1450    Specimen: Blood Updated: 04/19/24 1526     HCG Quantitative <0.10 mIU/mL     Narrative:      HCG Ranges by Gestational Age    Females - non-pregnant premenopausal   </= 1mIU/mL HCG  Females - postmenopausal               </= 7mIU/mL HCG    3 Weeks         5.8 -    71.2 mIU/mL  4 Weeks         9.5 -     750 mIU/mL  5 Weeks         217 -   7,138 mIU/mL  6 Weeks         158 -  31,795 mIU/mL  7 Weeks       3,697 - 163,563 mIU/mL  8 Weeks      32,065 - 149,571 mIU/mL  9 Weeks      63,803 - 151,410 mIU/mL  10 Weeks     46,509 - 186,977 mIU/mL  12 Weeks     27,832 - 210,612 mIU/mL  14 Weeks     13,950 -  62,530 mIU/mL  15 Weeks     12,039 -  70,971 mIU/mL  16 Weeks      9,040 -  56,451 mIU/mL  17 Weeks      8,175 -  55,868 mIU/mL  18 Weeks      8,099 -  58,176 mIU/mL    CBC Auto Differential [[id number]]  (Abnormal) Collected: 04/19/24 1450    Specimen: Blood Updated:  04/19/24 1501     WBC 7.91 10*3/mm3      RBC 4.60 10*6/mm3      Hemoglobin 13.7 g/dL      Hematocrit 41.1 %      MCV 89.3 fL      MCH 29.8 pg      MCHC 33.3 g/dL      RDW 12.4 %      RDW-SD 40.7 fl      MPV 11.1 fL      Platelets 214 10*3/mm3      Neutrophil % 73.7 %      Lymphocyte % 19.3 %      Monocyte % 4.8 %      Eosinophil % 1.0 %      Basophil % 0.9 %      Immature Grans % 0.3 %      Neutrophils, Absolute 5.83 10*3/mm3      Lymphocytes, Absolute 1.53 10*3/mm3      Monocytes, Absolute 0.38 10*3/mm3      Eosinophils, Absolute 0.08 10*3/mm3      Basophils, Absolute 0.07 10*3/mm3      Immature Grans, Absolute 0.02 10*3/mm3      nRBC 0.0 /100 WBC             No results found.    ED Course  ED Course as of 04/19/24 1802 Fri Apr 19, 2024   1529 Patient has been reassessed and she reports her nausea has improved after the first Zofran grams IV as well as proceeding with the LR 1 L bolus.  She has been educated with her laboratory data thus far.  She is not pregnant.  I will give her further antiemetics as well as more fluids with continued observation. [TK]   1800 Patient has been reassessed.  She is receive her third liter of fluid and 2 other antiemetics.  She is tolerating Sprite and crackers without further episodes of emesis.  I did previously offered CT scan of the pelvis with IV contrast if her pain was worsened.  The patient refuses a CT scan.  She reports her pain is nearly resolved.  She would like to go home at this time.  Educated patient to advance her diet slowly.  Will prescribe antiemetics as well.  Return to ER within 8 hours of acute issues.  Patient voiced understanding she will be discharged in stable condition. [TK]      ED Course User Index  [TK] Kingsley Cervantes PA          [facility]      Final diagnoses:   Nausea vomiting and diarrhea       Disposition: Patient will be discharged in stable condition.       Kingsley Cervantes PA  04/19/24 1803

## 2024-04-19 NOTE — Clinical Note
[facility] EMERGENCY DEPARTMENT  [address]  [facility] [address]  Phone: [phone]    Elvia Aviles was seen and treated in our emergency department on 4/19/2024.  She may return to work on 04/21/2024.         Thank you for choosing [facility].    Kingsley Cervantes PA

## 2024-07-20 ENCOUNTER — APPOINTMENT (OUTPATIENT)
Dept: ULTRASOUND IMAGING | Facility: HOSPITAL | Age: 29
End: 2024-07-20
Payer: COMMERCIAL

## 2024-07-20 ENCOUNTER — HOSPITAL ENCOUNTER (EMERGENCY)
Facility: HOSPITAL | Age: 29
Discharge: HOME | End: 2024-07-20
Payer: COMMERCIAL

## 2024-07-20 VITALS
WEIGHT: 123 LBS | HEART RATE: 77 BPM | OXYGEN SATURATION: 100 % | DIASTOLIC BLOOD PRESSURE: 55 MMHG | HEIGHT: 63 IN | RESPIRATION RATE: 18 BRPM | TEMPERATURE: 99.5 F | BODY MASS INDEX: 21.79 KG/M2 | SYSTOLIC BLOOD PRESSURE: 99 MMHG

## 2024-07-20 DIAGNOSIS — N76.0 BACTERIAL VAGINOSIS: ICD-10-CM

## 2024-07-20 DIAGNOSIS — B37.9 CANDIDA INFECTION: ICD-10-CM

## 2024-07-20 DIAGNOSIS — Z3A.01 LESS THAN 8 WEEKS GESTATION OF PREGNANCY: Primary | ICD-10-CM

## 2024-07-20 DIAGNOSIS — O41.8X10 SUBCHORIONIC HEMATOMA IN FIRST TRIMESTER, SINGLE OR UNSPECIFIED FETUS: ICD-10-CM

## 2024-07-20 DIAGNOSIS — O46.8X1 SUBCHORIONIC HEMATOMA IN FIRST TRIMESTER, SINGLE OR UNSPECIFIED FETUS: ICD-10-CM

## 2024-07-20 DIAGNOSIS — B96.89 BACTERIAL VAGINOSIS: ICD-10-CM

## 2024-07-20 LAB
ABO GROUP BLD: NORMAL
ALBUMIN SERPL-MCNC: 4.4 G/DL (ref 3.5–5.2)
ALBUMIN/GLOB SERPL: 1.6 G/DL
ALP SERPL-CCNC: 73 U/L (ref 39–117)
ALT SERPL W P-5'-P-CCNC: 10 U/L (ref 1–33)
ANION GAP SERPL CALCULATED.3IONS-SCNC: 12 MMOL/L (ref 5–15)
AST SERPL-CCNC: 16 U/L (ref 1–32)
BASOPHILS # BLD AUTO: 0.06 10*3/MM3 (ref 0–0.2)
BASOPHILS NFR BLD AUTO: 0.6 % (ref 0–1.5)
BILIRUB SERPL-MCNC: <0.2 MG/DL (ref 0–1.2)
BILIRUB UR QL STRIP: NEGATIVE
BLD GP AB SCN SERPL QL: NEGATIVE
BUN SERPL-MCNC: 7 MG/DL (ref 6–20)
BUN/CREAT SERPL: 13 (ref 7–25)
CALCIUM SPEC-SCNC: 8.9 MG/DL (ref 8.6–10.5)
CHLORIDE SERPL-SCNC: 102 MMOL/L (ref 98–107)
CLARITY UR: ABNORMAL
CLUE CELLS SPEC QL WET PREP: ABNORMAL
CO2 SERPL-SCNC: 24 MMOL/L (ref 22–29)
COLOR UR: YELLOW
CREAT SERPL-MCNC: 0.54 MG/DL (ref 0.57–1)
DEPRECATED RDW RBC AUTO: 41.5 FL (ref 37–54)
EGFRCR SERPLBLD CKD-EPI 2021: 129.6 ML/MIN/1.73
EOSINOPHIL # BLD AUTO: 0.13 10*3/MM3 (ref 0–0.4)
EOSINOPHIL NFR BLD AUTO: 1.3 % (ref 0.3–6.2)
ERYTHROCYTE [DISTWIDTH] IN BLOOD BY AUTOMATED COUNT: 12.7 % (ref 12.3–15.4)
GLOBULIN UR ELPH-MCNC: 2.8 GM/DL
GLUCOSE SERPL-MCNC: 89 MG/DL (ref 65–99)
GLUCOSE UR STRIP-MCNC: NEGATIVE MG/DL
HCG INTACT+B SERPL-ACNC: NORMAL MIU/ML
HCT VFR BLD AUTO: 38.3 % (ref 34–46.6)
HGB BLD-MCNC: 12.8 G/DL (ref 12–15.9)
HGB UR QL STRIP.AUTO: NEGATIVE
HYDATID CYST SPEC WET PREP: ABNORMAL
IMM GRANULOCYTES # BLD AUTO: 0.04 10*3/MM3 (ref 0–0.05)
IMM GRANULOCYTES NFR BLD AUTO: 0.4 % (ref 0–0.5)
KETONES UR QL STRIP: ABNORMAL
LEUKOCYTE ESTERASE UR QL STRIP.AUTO: NEGATIVE
LIPASE SERPL-CCNC: 124 U/L (ref 13–60)
LYMPHOCYTES # BLD AUTO: 2.34 10*3/MM3 (ref 0.7–3.1)
LYMPHOCYTES NFR BLD AUTO: 23.1 % (ref 19.6–45.3)
MCH RBC QN AUTO: 30 PG (ref 26.6–33)
MCHC RBC AUTO-ENTMCNC: 33.4 G/DL (ref 31.5–35.7)
MCV RBC AUTO: 89.9 FL (ref 79–97)
MONOCYTES # BLD AUTO: 0.46 10*3/MM3 (ref 0.1–0.9)
MONOCYTES NFR BLD AUTO: 4.5 % (ref 5–12)
NEUTROPHILS NFR BLD AUTO: 7.09 10*3/MM3 (ref 1.7–7)
NEUTROPHILS NFR BLD AUTO: 70.1 % (ref 42.7–76)
NITRITE UR QL STRIP: NEGATIVE
NRBC BLD AUTO-RTO: 0 /100 WBC (ref 0–0.2)
NUMBER OF DOSES: NORMAL
PH UR STRIP.AUTO: 7 [PH] (ref 5–8)
PLATELET # BLD AUTO: 203 10*3/MM3 (ref 140–450)
PMV BLD AUTO: 11 FL (ref 6–12)
POTASSIUM SERPL-SCNC: 3.7 MMOL/L (ref 3.5–5.2)
PROT SERPL-MCNC: 7.2 G/DL (ref 6–8.5)
PROT UR QL STRIP: NEGATIVE
RBC # BLD AUTO: 4.26 10*6/MM3 (ref 3.77–5.28)
RH BLD: POSITIVE
SODIUM SERPL-SCNC: 138 MMOL/L (ref 136–145)
SP GR UR STRIP: 1.02 (ref 1–1.03)
T VAGINALIS SPEC QL WET PREP: ABNORMAL
UROBILINOGEN UR QL STRIP: ABNORMAL
WBC NRBC COR # BLD AUTO: 10.12 10*3/MM3 (ref 3.4–10.8)
WBC SPEC QL WET PREP: ABNORMAL
YEAST GENITAL QL WET PREP: ABNORMAL

## 2024-07-20 PROCEDURE — 83690 ASSAY OF LIPASE: CPT | Performed by: NURSE PRACTITIONER

## 2024-07-20 PROCEDURE — 87591 N.GONORRHOEAE DNA AMP PROB: CPT | Performed by: NURSE PRACTITIONER

## 2024-07-20 PROCEDURE — 87210 SMEAR WET MOUNT SALINE/INK: CPT | Performed by: NURSE PRACTITIONER

## 2024-07-20 PROCEDURE — 80053 COMPREHEN METABOLIC PANEL: CPT | Performed by: NURSE PRACTITIONER

## 2024-07-20 PROCEDURE — 86900 BLOOD TYPING SEROLOGIC ABO: CPT | Performed by: NURSE PRACTITIONER

## 2024-07-20 PROCEDURE — 84702 CHORIONIC GONADOTROPIN TEST: CPT | Performed by: NURSE PRACTITIONER

## 2024-07-20 PROCEDURE — 25810000003 LACTATED RINGERS SOLUTION: Performed by: NURSE PRACTITIONER

## 2024-07-20 PROCEDURE — 86850 RBC ANTIBODY SCREEN: CPT | Performed by: NURSE PRACTITIONER

## 2024-07-20 PROCEDURE — 81003 URINALYSIS AUTO W/O SCOPE: CPT | Performed by: NURSE PRACTITIONER

## 2024-07-20 PROCEDURE — 85025 COMPLETE CBC W/AUTO DIFF WBC: CPT | Performed by: NURSE PRACTITIONER

## 2024-07-20 PROCEDURE — 96374 THER/PROPH/DIAG INJ IV PUSH: CPT

## 2024-07-20 PROCEDURE — 25010000002 ONDANSETRON PER 1 MG: Performed by: NURSE PRACTITIONER

## 2024-07-20 PROCEDURE — 87491 CHLMYD TRACH DNA AMP PROBE: CPT | Performed by: NURSE PRACTITIONER

## 2024-07-20 PROCEDURE — 99284 EMERGENCY DEPT VISIT MOD MDM: CPT

## 2024-07-20 PROCEDURE — 76817 TRANSVAGINAL US OBSTETRIC: CPT

## 2024-07-20 PROCEDURE — 86901 BLOOD TYPING SEROLOGIC RH(D): CPT | Performed by: NURSE PRACTITIONER

## 2024-07-20 RX ORDER — METRONIDAZOLE 500 MG/1
500 TABLET ORAL 3 TIMES DAILY
Qty: 30 TABLET | Refills: 0 | Status: SHIPPED | OUTPATIENT
Start: 2024-07-20 | End: 2024-07-30

## 2024-07-20 RX ORDER — FLUCONAZOLE 150 MG/1
150 TABLET ORAL ONCE
Qty: 1 TABLET | Refills: 0 | Status: SHIPPED | OUTPATIENT
Start: 2024-07-20 | End: 2024-07-20

## 2024-07-20 RX ORDER — ONDANSETRON 2 MG/ML
4 INJECTION INTRAMUSCULAR; INTRAVENOUS ONCE
Status: COMPLETED | OUTPATIENT
Start: 2024-07-20 | End: 2024-07-20

## 2024-07-20 RX ORDER — ONDANSETRON 4 MG/1
4 TABLET, ORALLY DISINTEGRATING ORAL EVERY 6 HOURS PRN
Qty: 10 TABLET | Refills: 0 | Status: SHIPPED | OUTPATIENT
Start: 2024-07-20

## 2024-07-20 RX ADMIN — ONDANSETRON 4 MG: 2 INJECTION INTRAMUSCULAR; INTRAVENOUS at 15:39

## 2024-07-20 RX ADMIN — SODIUM CHLORIDE, POTASSIUM CHLORIDE, SODIUM LACTATE AND CALCIUM CHLORIDE 1000 ML: 600; 310; 30; 20 INJECTION, SOLUTION INTRAVENOUS at 15:39

## 2024-07-20 NOTE — ED PROVIDER NOTES
Subjective   History of Present Illness  Patient is a 28-year-old female presents the emergency department with lower abdominal pain and lower back pain for the past week.  She has had nausea with vomiting as well.  She states she is pregnant as well.  Her last menstrual period was .  She has not had her first OB appointment.  She states she noticed trace bleeding when she would wipe after using the bathroom today.  She states she has had increased pain today.  No vaginal discharge.   1 para 0.  No fever or chills.  She states she feels very weak today as well.    History provided by:  Patient   used: No        Review of Systems   Constitutional: Negative.    HENT: Negative.     Eyes: Negative.    Respiratory: Negative.     Cardiovascular: Negative.    Gastrointestinal: Negative.    Endocrine: Negative.    Genitourinary:         Patient is a 28-year-old female presents the emergency department with lower abdominal pain and lower back pain for the past week.  She has had nausea with vomiting as well.  She states she is pregnant as well.  Her last menstrual period was .  She has not had her first OB appointment.  She states she noticed trace bleeding when she would wipe after using the bathroom today.  She states she has had increased pain today.  No vaginal discharge.   1 para 0.  No fever or chills.  She states she feels very weak today as well.     Musculoskeletal: Negative.    Skin: Negative.    Allergic/Immunologic: Negative.    Neurological: Negative.    Hematological: Negative.    Psychiatric/Behavioral: Negative.     All other systems reviewed and are negative.      Past Medical History:   Diagnosis Date    Patient denies medical problems        No Known Allergies    Past Surgical History:   Procedure Laterality Date    CHOLECYSTECTOMY         History reviewed. No pertinent family history.    Social History     Socioeconomic History    Marital status: Significant  "Other   Tobacco Use    Smoking status: Former     Types: Cigarettes   Vaping Use    Vaping status: Every Day   Substance and Sexual Activity    Alcohol use: Not Currently    Drug use: Yes     Types: Marijuana    Sexual activity: Defer       Prior to Admission medications    Medication Sig Start Date End Date Taking? Authorizing Provider   albuterol sulfate  (90 Base) MCG/ACT inhaler Inhale 2 puffs Every 4 (Four) Hours As Needed for Wheezing. 10/26/22   Miguelangel Hernandez MD   busPIRone (BUSPAR) 5 MG tablet Take 1 tablet by mouth 3 (Three) Times a Day. Prn anxiety 9/6/22   Nina Palma APRN   doxylamine-pyridoxine (DICLEGIS) 10-10 MG tablet delayed-release EC tablet Take 2 tablets by mouth At Night As Needed (vomiting). 4/19/24   Kingsley Cervantes PA   methylPREDNISolone (MEDROL) 4 MG dose pack Take as directed on package instructions. 10/26/22   Miguelangel Hernandez MD   ondansetron ODT (ZOFRAN-ODT) 4 MG disintegrating tablet Take 1 tablet by mouth Every 6 (Six) Hours As Needed for Nausea or Vomiting. 4/19/24   Kingsley Cervantes PA       BP 99/55   Pulse 77   Temp 99.5 °F (37.5 °C) (Oral)   Resp 18   Ht 160 cm (63\")   Wt 55.8 kg (123 lb)   SpO2 100%   BMI 21.79 kg/m²     Objective   Physical Exam  Vitals and nursing note reviewed.   Constitutional:       Appearance: She is well-developed.      Comments: Non toxic appearing. Pt tearful throughout exam. No acute distress   HENT:      Head: Normocephalic and atraumatic.   Eyes:      Conjunctiva/sclera: Conjunctivae normal.      Pupils: Pupils are equal, round, and reactive to light.   Neck:      Thyroid: No thyromegaly.      Trachea: No tracheal deviation.   Cardiovascular:      Rate and Rhythm: Normal rate and regular rhythm.      Heart sounds: Normal heart sounds.   Pulmonary:      Effort: Pulmonary effort is normal. No respiratory distress.      Breath sounds: Normal breath sounds. No wheezing or rales.   Chest:      Chest wall: No " tenderness.   Abdominal:      General: Bowel sounds are normal.      Palpations: Abdomen is soft.      Comments: Abdomen is soft, nondistended.  She has some tenderness in the bilateral lower quadrants.  There is no guarding or rebound noted.   Genitourinary:     Vagina: Normal.      Comments: No bleeding vaginal vault. Mild clear drainage noted to vaginal vault. No cmt or adnexa tenderness noted    Musculoskeletal:         General: Normal range of motion.      Cervical back: Normal range of motion and neck supple.   Skin:     General: Skin is warm and dry.      Comments: Mild pallor noted   Neurological:      Mental Status: She is alert and oriented to person, place, and time.      Cranial Nerves: No cranial nerve deficit.      Deep Tendon Reflexes: Reflexes are normal and symmetric.   Psychiatric:         Behavior: Behavior normal.         Thought Content: Thought content normal.         Judgment: Judgment normal.         Procedures         Lab Results (last 24 hours)       Procedure Component Value Units Date/Time    Urinalysis With Culture If Indicated - Urine, Clean Catch [[id number]]  (Abnormal) Collected: 07/20/24 1525    Specimen: Urine, Clean Catch Updated: 07/20/24 1546     Color, UA Yellow     Appearance, UA Cloudy     pH, UA 7.0     Specific Gravity, UA 1.018     Glucose, UA Negative     Ketones, UA 15 mg/dL (1+)     Bilirubin, UA Negative     Blood, UA Negative     Protein, UA Negative     Leuk Esterase, UA Negative     Nitrite, UA Negative     Urobilinogen, UA 0.2 E.U./dL    Narrative:      In absence of clinical symptoms, the presence of pyuria, bacteria, and/or nitrites on the urinalysis result does not correlate with infection.  Urine microscopic not indicated.    CBC & Differential [[id number]]  (Abnormal) Collected: 07/20/24 1533    Specimen: Blood Updated: 07/20/24 1545    Narrative:      The following orders were created for panel order CBC & Differential.  Procedure                                Abnormality         Status                     ---------                               -----------         ------                     CBC Auto Differential[[id number]]        Abnormal            Final result                 Please view results for these tests on the individual orders.    Comprehensive Metabolic Panel [[id number]]  (Abnormal) Collected: 07/20/24 1533    Specimen: Blood Updated: 07/20/24 1603     Glucose 89 mg/dL      BUN 7 mg/dL      Creatinine 0.54 mg/dL      Sodium 138 mmol/L      Potassium 3.7 mmol/L      Chloride 102 mmol/L      CO2 24.0 mmol/L      Calcium 8.9 mg/dL      Total Protein 7.2 g/dL      Albumin 4.4 g/dL      ALT (SGPT) 10 U/L      AST (SGOT) 16 U/L      Alkaline Phosphatase 73 U/L      Total Bilirubin <0.2 mg/dL      Globulin 2.8 gm/dL      A/G Ratio 1.6 g/dL      BUN/Creatinine Ratio 13.0     Anion Gap 12.0 mmol/L      eGFR 129.6 mL/min/1.73     Narrative:      GFR Normal >60  Chronic Kidney Disease <60  Kidney Failure <15      Lipase [[id number]]  (Abnormal) Collected: 07/20/24 1533    Specimen: Blood Updated: 07/20/24 1558     Lipase 124 U/L     hCG, Quantitative, Pregnancy [[id number]] Collected: 07/20/24 1533    Specimen: Blood Updated: 07/20/24 1631     HCG Quantitative 53,827.00 mIU/mL     Narrative:      HCG Ranges by Gestational Age    Females - non-pregnant premenopausal   </= 1mIU/mL HCG  Females - postmenopausal               </= 7mIU/mL HCG    3 Weeks         5.8 -    71.2 mIU/mL  4 Weeks         9.5 -     750 mIU/mL  5 Weeks         217 -   7,138 mIU/mL  6 Weeks         158 -  31,795 mIU/mL  7 Weeks       3,697 - 163,563 mIU/mL  8 Weeks      32,065 - 149,571 mIU/mL  9 Weeks      63,803 - 151,410 mIU/mL  10 Weeks     46,509 - 186,977 mIU/mL  12 Weeks     27,832 - 210,612 mIU/mL  14 Weeks     13,950 -  62,530 mIU/mL  15 Weeks     12,039 -  70,971 mIU/mL  16 Weeks      9,040 -  56,451 mIU/mL  17 Weeks      8,175 -  55,868 mIU/mL  18 Weeks      8,099 -  58,176 mIU/mL    CBC  Auto Differential [[id number]]  (Abnormal) Collected: 07/20/24 1533    Specimen: Blood Updated: 07/20/24 1545     WBC 10.12 10*3/mm3      RBC 4.26 10*6/mm3      Hemoglobin 12.8 g/dL      Hematocrit 38.3 %      MCV 89.9 fL      MCH 30.0 pg      MCHC 33.4 g/dL      RDW 12.7 %      RDW-SD 41.5 fl      MPV 11.0 fL      Platelets 203 10*3/mm3      Neutrophil % 70.1 %      Lymphocyte % 23.1 %      Monocyte % 4.5 %      Eosinophil % 1.3 %      Basophil % 0.6 %      Immature Grans % 0.4 %      Neutrophils, Absolute 7.09 10*3/mm3      Lymphocytes, Absolute 2.34 10*3/mm3      Monocytes, Absolute 0.46 10*3/mm3      Eosinophils, Absolute 0.13 10*3/mm3      Basophils, Absolute 0.06 10*3/mm3      Immature Grans, Absolute 0.04 10*3/mm3      nRBC 0.0 /100 WBC     Wet Prep, Genital - Swab, Vagina [[id number]]  (Abnormal) Collected: 07/20/24 1549    Specimen: Swab from Vagina Updated: 07/20/24 1604     YEAST 2+ Yeast seen     HYPHAL ELEMENTS 1+ Hyphal elements seen     WBC'S 1+ WBC's seen     Clue Cells, Wet Prep 1+ Clue cells seen     Trichomonas, Wet Prep No Trichomonas seen    Chlamydia trachomatis, Neisseria gonorrhoeae, PCR - Swab, Cervix [[id number]] Collected: 07/20/24 1549    Specimen: Swab from Cervix Updated: 07/20/24 1553            US Ob Transvaginal   Final Result   1.  Single intrauterine pregnancy with estimated gestational age of 5   weeks 6 days. Fetal heart tones detected, fetal heart rate 100 bpm.   2.  1.0 x 0.6 x 1.6 cm subchorionic hematoma. Recommend close clinical   and imaging follow-up.           This report was signed and finalized on 7/20/2024 4:36 PM by Dr. Den Poon MD.              ED Course  ED Course as of 07/20/24 1717   Sat Jul 20, 2024   1703 IMPRESSION:  1.  Single intrauterine pregnancy with estimated gestational age of 5  weeks 6 days. Fetal heart tones detected, fetal heart rate 100 bpm.  2.  1.0 x 0.6 x 1.6 cm subchorionic hematoma. Recommend close clinical  and imaging follow-up.         This report was signed and finalized on 2024 4:36 PM by Dr. Den Poon MD.   [CW]   9354 The ultrasound shows a single IUP with estimated gestational age of 5 weeks 6 days fetal heart tones detected at 100.  There is a subchorionic hematoma recommend close clinical and imaging follow-up.  Urinalysis shows 15 ketones otherwise unremarkable.  CMP is unremarkable.  CBC no leukocytosis with a white count of 10.12 hemoglobin 12.8 hematocrit 38.  Lipase 124.  Patient is O+.  Quantitative hCG is 53,827.  Wet prep shows 2+ yeast 1+ WBCs 1+ clue cells.  Patient's had Zofran and a liter of lactated Ringer's.  She is feeling much better.  Reviewed results of testing with the patient.  Her appointment with OB is  in [place].  Advised patient she needs to follow-up with them next week.  Will send patient in Diflucan and Flagyl and Zofran.  Advised the patient to return the emergency department she had increased pain, vaginal bleeding, nausea.  She is in agreement with the care plan and voices understanding of instructions.  Patient will be discharged home shortly in stable condition.  Advised to return to emergency department if her symptoms worsen. Reviewed pt and pt care plan with Dr. Hernandez - also in agreement with care plan  [CW]      ED Course User Index  [CW] Scarlet Jolly APRN        Medical Decision Making  Patient is a 28-year-old female presents the emergency department with lower abdominal pain and lower back pain for the past week.  She has had nausea with vomiting as well.  She states she is pregnant as well.  Her last menstrual period was .  She has not had her first OB appointment.  She states she noticed trace bleeding when she would wipe after using the bathroom today.  She states she has had increased pain today.  No vaginal discharge.   1 para 0.  No fever or chills.  She states she feels very weak today as well.  Course of treatment in the er: non toxic  appearing. No acute distress.  Patient is tearful throughout exam.  PERRL extraocular movements are intact.  TMs normal pharynx normal.  Lungs clear to auscultation.  CV normal sinus rhythm.  Abdomen is soft, nondistended.  She has tenderness bilateral lower quadrants.  No guarding or rebound noted.  Will perform pelvic exam as well.  Quantitative hCG, OB ultrasound, laboratory studies, pelvic exam, wet prep and [place] probe have been ordered.  IV fluid 1 L of lactated ringer bolus ordered.  Zofran ordered as well.  Vaginal exam: no cmt tenderness noted. No adnexa tenderness noted. Trace clear vaginal discharge noted vaginal vault. Specimens collected   Differential diagnosis to include but not limited to: uti; pyelonephritis; ectopic pregnancy; volume depletion; subchorionic hematoma; ovarian cyst; pid   US Ob Transvaginal   Final Result    1.  Single intrauterine pregnancy with estimated gestational age of 5    weeks 6 days. Fetal heart tones detected, fetal heart rate 100 bpm.    2.  1.0 x 0.6 x 1.6 cm subchorionic hematoma. Recommend close clinical    and imaging follow-up.              This report was signed and finalized on 7/20/2024 4:36 PM by Dr. Den Poon MD.          Labs Reviewed  WET PREP, GENITAL - Abnormal; Notable for the following components:     YEAST                           (*)                  HYPHAL ELEMENTS                 (*)                  WBC'S                           (*)                  Clue Cells, Wet Prep            (*)               All other components within normal limits  COMPREHENSIVE METABOLIC PANEL - Abnormal; Notable for the following components:     Creatinine                    0.54 (*)            All other components within normal limits         Narrative: GFR Normal >60                  Chronic Kidney Disease <60                  Kidney Failure <15                    LIPASE - Abnormal; Notable for the following components:     Lipase                        124  (*)             All other components within normal limits  URINALYSIS W/ CULTURE IF INDICATED - Abnormal; Notable for the following components:     Appearance, UA                Cloudy (*)               Ketones, UA                     (*)               All other components within normal limits         Narrative: In absence of clinical symptoms, the presence of pyuria, bacteria, and/or nitrites on the urinalysis result does not correlate with infection.                  Urine microscopic not indicated.  CBC WITH AUTO DIFFERENTIAL - Abnormal; Notable for the following components:     Monocyte %                    4.5 (*)                Neutrophils, Absolute         7.09 (*)            All other components within normal limits  CHLAMYDIA TRACHOMATIS, NEISSERIA GONORRHOEAE, PCR  HCG, QUANTITATIVE, PREGNANCY         Narrative: HCG Ranges by Gestational Age                                    Females - non-pregnant premenopausal   </= 1mIU/mL HCG                  Females - postmenopausal               </= 7mIU/mL HCG                                    3 Weeks         5.8 -    71.2 mIU/mL                  4 Weeks         9.5 -     750 mIU/mL                  5 Weeks         217 -   7,138 mIU/mL                  6 Weeks         158 -  31,795 mIU/mL                  7 Weeks       3,697 - 163,563 mIU/mL                  8 Weeks      32,065 - 149,571 mIU/mL                  9 Weeks      63,803 - 151,410 mIU/mL                  10 Weeks     46,509 - 186,977 mIU/mL                  12 Weeks     27,832 - 210,612 mIU/mL                  14 Weeks     13,950 -  62,530 mIU/mL                  15 Weeks     12,039 -  70,971 mIU/mL                  16 Weeks      9,040 -  56,451 mIU/mL                  17 Weeks      8,175 -  55,868 mIU/mL                  18 Weeks      8,099 -  58,176 mIU/mL   RHIG EVALUATION  DOSES OF RH IMMUNE GLOBULIN  CBC AND DIFFERENTIAL  The ultrasound shows a single IUP with estimated gestational age of 5  weeks 6 days fetal heart tones detected at 100.  There is a subchorionic hematoma recommend close clinical and imaging follow-up.  Urinalysis shows 15 ketones otherwise unremarkable.  CMP is unremarkable.  CBC no leukocytosis with a white count of 10.12 hemoglobin 12.8 hematocrit 38.  Lipase 124.  Patient is O+.  Quantitative hCG is 53,827.  Wet prep shows 2+ yeast 1+ WBCs 1+ clue cells.  Patient's had Zofran and a liter of lactated Ringer's.  She is feeling much better.  Reviewed results of testing with the patient.  Her appointment with OB is August 6 in [place].  Advised patient she needs to follow-up with them next week.  Will send patient in Diflucan and Flagyl and Zofran.  Advised the patient to return the emergency department she had increased pain, vaginal bleeding, nausea.  She is in agreement with the care plan and voices understanding of instructions.  Patient will be discharged home shortly in stable condition.  Advised to return to emergency department if her symptoms worsen.      Amount and/or Complexity of Data Reviewed  Labs: ordered. Decision-making details documented in ED Course.  Radiology: ordered. Decision-making details documented in ED Course.    Risk  Prescription drug management.         Final diagnoses:   Less than 8 weeks gestation of pregnancy   Bacterial vaginosis   Candida infection   Subchorionic hematoma in first trimester, single or unspecified fetus          Scarlet Jolly, APRN  07/20/24 4864

## 2024-07-20 NOTE — DISCHARGE INSTRUCTIONS
Return to ER if symptoms worsen   Follow up with OB in [place], [place]  Return to the er if increased pain , vaginal bleeding, fever, symptoms worsen

## 2024-07-20 NOTE — Clinical Note
[facility] EMERGENCY DEPARTMENT  [address]  [facility] [address]  Phone: [phone]    Elvia Aviles was seen and treated in our emergency department on 7/20/2024.  She may return to work on 07/25/2024.         Thank you for choosing [facility].    Scarlet Jolly APRN

## 2024-07-22 LAB
C TRACH RRNA CVX QL NAA+PROBE: NOT DETECTED
N GONORRHOEA RRNA SPEC QL NAA+PROBE: NOT DETECTED

## 2024-08-27 LAB
ABO, EXTERNAL RESULT: NORMAL
HEP B, EXTERNAL RESULT: NEGATIVE
HEPATITIS C ANTIBODY, EXTERNAL RESULT: NEGATIVE
HIV, EXTERNAL RESULT: NONREACTIVE
N. GONORRHOEAE, EXTERNAL RESULT: NEGATIVE
RH FACTOR, EXTERNAL RESULT: POSITIVE
RPR, EXTERNAL RESULT: NONREACTIVE
RUBELLA TITER, EXTERNAL RESULT: NORMAL

## 2024-10-26 ENCOUNTER — HOSPITAL ENCOUNTER (EMERGENCY)
Facility: HOSPITAL | Age: 29
Discharge: HOME | End: 2024-10-26
Attending: FAMILY MEDICINE

## 2024-10-26 ENCOUNTER — APPOINTMENT (OUTPATIENT)
Dept: ULTRASOUND IMAGING | Facility: HOSPITAL | Age: 29
End: 2024-10-26

## 2024-10-26 VITALS
HEART RATE: 91 BPM | WEIGHT: 145.5 LBS | HEIGHT: 63 IN | BODY MASS INDEX: 25.78 KG/M2 | SYSTOLIC BLOOD PRESSURE: 115 MMHG | TEMPERATURE: 98.2 F | OXYGEN SATURATION: 100 % | DIASTOLIC BLOOD PRESSURE: 72 MMHG | RESPIRATION RATE: 14 BRPM

## 2024-10-26 DIAGNOSIS — R10.30 LOWER ABDOMINAL PAIN: Primary | ICD-10-CM

## 2024-10-26 DIAGNOSIS — Z3A.19 19 WEEKS GESTATION OF PREGNANCY: ICD-10-CM

## 2024-10-26 LAB
ALBUMIN SERPL-MCNC: 4.1 G/DL (ref 3.5–5.2)
ALBUMIN/GLOB SERPL: 1.6 G/DL
ALP SERPL-CCNC: 80 U/L (ref 39–117)
ALT SERPL W P-5'-P-CCNC: 11 U/L (ref 1–33)
ANION GAP SERPL CALCULATED.3IONS-SCNC: 11 MMOL/L (ref 5–15)
APTT PPP: 28.8 SECONDS (ref 24.5–36)
AST SERPL-CCNC: 16 U/L (ref 1–32)
BASOPHILS # BLD AUTO: 0.05 10*3/MM3 (ref 0–0.2)
BASOPHILS NFR BLD AUTO: 0.4 % (ref 0–1.5)
BILIRUB SERPL-MCNC: <0.2 MG/DL (ref 0–1.2)
BILIRUB UR QL STRIP: NEGATIVE
BUN SERPL-MCNC: 6 MG/DL (ref 6–20)
BUN/CREAT SERPL: 14 (ref 7–25)
CALCIUM SPEC-SCNC: 8.5 MG/DL (ref 8.6–10.5)
CHLORIDE SERPL-SCNC: 103 MMOL/L (ref 98–107)
CLARITY UR: CLEAR
CO2 SERPL-SCNC: 24 MMOL/L (ref 22–29)
COLOR UR: YELLOW
CREAT SERPL-MCNC: 0.43 MG/DL (ref 0.57–1)
DEPRECATED RDW RBC AUTO: 40.9 FL (ref 37–54)
EGFRCR SERPLBLD CKD-EPI 2021: 136.9 ML/MIN/1.73
EOSINOPHIL # BLD AUTO: 0.12 10*3/MM3 (ref 0–0.4)
EOSINOPHIL NFR BLD AUTO: 1 % (ref 0.3–6.2)
ERYTHROCYTE [DISTWIDTH] IN BLOOD BY AUTOMATED COUNT: 12.5 % (ref 12.3–15.4)
GLOBULIN UR ELPH-MCNC: 2.5 GM/DL
GLUCOSE SERPL-MCNC: 82 MG/DL (ref 65–99)
GLUCOSE UR STRIP-MCNC: NEGATIVE MG/DL
HCT VFR BLD AUTO: 35.2 % (ref 34–46.6)
HGB BLD-MCNC: 11.6 G/DL (ref 12–15.9)
HGB UR QL STRIP.AUTO: NEGATIVE
IMM GRANULOCYTES # BLD AUTO: 0.06 10*3/MM3 (ref 0–0.05)
IMM GRANULOCYTES NFR BLD AUTO: 0.5 % (ref 0–0.5)
INR PPP: 0.95 (ref 0.91–1.09)
KETONES UR QL STRIP: ABNORMAL
LEUKOCYTE ESTERASE UR QL STRIP.AUTO: NEGATIVE
LIPASE SERPL-CCNC: 21 U/L (ref 13–60)
LYMPHOCYTES # BLD AUTO: 1.96 10*3/MM3 (ref 0.7–3.1)
LYMPHOCYTES NFR BLD AUTO: 16.5 % (ref 19.6–45.3)
MAGNESIUM SERPL-MCNC: 1.8 MG/DL (ref 1.6–2.6)
MCH RBC QN AUTO: 29.9 PG (ref 26.6–33)
MCHC RBC AUTO-ENTMCNC: 33 G/DL (ref 31.5–35.7)
MCV RBC AUTO: 90.7 FL (ref 79–97)
MONOCYTES # BLD AUTO: 0.48 10*3/MM3 (ref 0.1–0.9)
MONOCYTES NFR BLD AUTO: 4 % (ref 5–12)
NEUTROPHILS NFR BLD AUTO: 77.6 % (ref 42.7–76)
NEUTROPHILS NFR BLD AUTO: 9.23 10*3/MM3 (ref 1.7–7)
NITRITE UR QL STRIP: NEGATIVE
NRBC BLD AUTO-RTO: 0 /100 WBC (ref 0–0.2)
PH UR STRIP.AUTO: 6.5 [PH] (ref 5–8)
PLATELET # BLD AUTO: 198 10*3/MM3 (ref 140–450)
PMV BLD AUTO: 10.7 FL (ref 6–12)
POTASSIUM SERPL-SCNC: 3.8 MMOL/L (ref 3.5–5.2)
PROT SERPL-MCNC: 6.6 G/DL (ref 6–8.5)
PROT UR QL STRIP: NEGATIVE
PROTHROMBIN TIME: 13 SECONDS (ref 11.8–14.8)
RBC # BLD AUTO: 3.88 10*6/MM3 (ref 3.77–5.28)
SODIUM SERPL-SCNC: 138 MMOL/L (ref 136–145)
SP GR UR STRIP: 1.01 (ref 1–1.03)
UROBILINOGEN UR QL STRIP: ABNORMAL
WBC NRBC COR # BLD AUTO: 11.9 10*3/MM3 (ref 3.4–10.8)

## 2024-10-26 PROCEDURE — 25810000003 SODIUM CHLORIDE 0.9 % SOLUTION: Performed by: FAMILY MEDICINE

## 2024-10-26 PROCEDURE — 96374 THER/PROPH/DIAG INJ IV PUSH: CPT

## 2024-10-26 PROCEDURE — 85610 PROTHROMBIN TIME: CPT | Performed by: FAMILY MEDICINE

## 2024-10-26 PROCEDURE — 83690 ASSAY OF LIPASE: CPT | Performed by: FAMILY MEDICINE

## 2024-10-26 PROCEDURE — 76815 OB US LIMITED FETUS(S): CPT

## 2024-10-26 PROCEDURE — 25010000002 ONDANSETRON PER 1 MG: Performed by: FAMILY MEDICINE

## 2024-10-26 PROCEDURE — 85025 COMPLETE CBC W/AUTO DIFF WBC: CPT | Performed by: FAMILY MEDICINE

## 2024-10-26 PROCEDURE — 83735 ASSAY OF MAGNESIUM: CPT | Performed by: FAMILY MEDICINE

## 2024-10-26 PROCEDURE — 80053 COMPREHEN METABOLIC PANEL: CPT | Performed by: FAMILY MEDICINE

## 2024-10-26 PROCEDURE — 99284 EMERGENCY DEPT VISIT MOD MDM: CPT

## 2024-10-26 PROCEDURE — 81003 URINALYSIS AUTO W/O SCOPE: CPT | Performed by: FAMILY MEDICINE

## 2024-10-26 PROCEDURE — 85730 THROMBOPLASTIN TIME PARTIAL: CPT | Performed by: FAMILY MEDICINE

## 2024-10-26 RX ORDER — SODIUM CHLORIDE 0.9 % (FLUSH) 0.9 %
10 SYRINGE (ML) INJECTION AS NEEDED
Status: DISCONTINUED | OUTPATIENT
Start: 2024-10-26 | End: 2024-10-26 | Stop reason: HOSPADM

## 2024-10-26 RX ORDER — ONDANSETRON 2 MG/ML
4 INJECTION INTRAMUSCULAR; INTRAVENOUS ONCE
Status: COMPLETED | OUTPATIENT
Start: 2024-10-26 | End: 2024-10-26

## 2024-10-26 RX ADMIN — ONDANSETRON 4 MG: 2 INJECTION INTRAMUSCULAR; INTRAVENOUS at 17:08

## 2024-10-26 RX ADMIN — SODIUM CHLORIDE 1000 ML: 9 INJECTION, SOLUTION INTRAVENOUS at 17:07

## 2024-10-26 NOTE — ED PROVIDER NOTES
Subjective   History of Present Illness  Two 8-year-old female who is 19 weeks and 5 days pregnant.  She will be 20 weeks on Monday.  She states she has been having some lower abdominal pressure.  She states that she has not felt the baby kicking or moving today.  She has some nausea.  No fevers.  No vomiting.  No diarrhea.  She states that she had a bowel movement thinking that would help her feel better but she did not feel better.  Patient denies any other symptoms at this time.      Review of Systems   Gastrointestinal:  Positive for abdominal pain.   All other systems reviewed and are negative.      Past Medical History:   Diagnosis Date    Patient denies medical problems        No Known Allergies    Past Surgical History:   Procedure Laterality Date    CHOLECYSTECTOMY         No family history on file.    Social History     Socioeconomic History    Marital status: Significant Other   Tobacco Use    Smoking status: Former     Types: Cigarettes   Vaping Use    Vaping status: Every Day   Substance and Sexual Activity    Alcohol use: Not Currently    Drug use: Yes     Types: Marijuana    Sexual activity: Defer           Objective   Physical Exam  Vitals and nursing note reviewed.   Constitutional:       Appearance: She is well-developed.   HENT:      Head: Normocephalic and atraumatic.      Mouth/Throat:      Mouth: Mucous membranes are moist.   Eyes:      Extraocular Movements: Extraocular movements intact.      Pupils: Pupils are equal, round, and reactive to light.   Cardiovascular:      Rate and Rhythm: Normal rate and regular rhythm.      Heart sounds: Normal heart sounds.   Pulmonary:      Effort: Pulmonary effort is normal.      Breath sounds: Normal breath sounds.   Abdominal:      General: Bowel sounds are normal.      Palpations: Abdomen is soft.   Skin:     General: Skin is warm and dry.   Neurological:      General: No focal deficit present.      Mental Status: She is alert and oriented to person,  place, and time.   Psychiatric:         Mood and Affect: Mood normal.         Behavior: Behavior normal.         Procedures           ED Course                                             Lab Results (last 24 hours)       Procedure Component Value Units Date/Time    CBC & Differential [[id number]]  (Abnormal) Collected: 10/26/24 1550    Specimen: Blood Updated: 10/26/24 1556    Narrative:      The following orders were created for panel order CBC & Differential.  Procedure                               Abnormality         Status                     ---------                               -----------         ------                     CBC Auto Differential[[id number]]        Abnormal            Final result                 Please view results for these tests on the individual orders.    Comprehensive Metabolic Panel [[id number]]  (Abnormal) Collected: 10/26/24 1550    Specimen: Blood Updated: 10/26/24 1621     Glucose 82 mg/dL      BUN 6 mg/dL      Creatinine 0.43 mg/dL      Sodium 138 mmol/L      Potassium 3.8 mmol/L      Chloride 103 mmol/L      CO2 24.0 mmol/L      Calcium 8.5 mg/dL      Total Protein 6.6 g/dL      Albumin 4.1 g/dL      ALT (SGPT) 11 U/L      AST (SGOT) 16 U/L      Alkaline Phosphatase 80 U/L      Total Bilirubin <0.2 mg/dL      Globulin 2.5 gm/dL      A/G Ratio 1.6 g/dL      BUN/Creatinine Ratio 14.0     Anion Gap 11.0 mmol/L      eGFR 136.9 mL/min/1.73     Narrative:      GFR Normal >60  Chronic Kidney Disease <60  Kidney Failure <15      Protime-INR [[id number]]  (Normal) Collected: 10/26/24 1550    Specimen: Blood Updated: 10/26/24 1606     Protime 13.0 Seconds      INR 0.95    aPTT [[id number]]  (Normal) Collected: 10/26/24 1550    Specimen: Blood Updated: 10/26/24 1606     PTT 28.8 seconds     Lipase [[id number]]  (Normal) Collected: 10/26/24 1550    Specimen: Blood Updated: 10/26/24 1616     Lipase 21 U/L     Magnesium [[id number]]  (Normal) Collected: 10/26/24 1550    Specimen: Blood Updated:  10/26/24 1621     Magnesium 1.8 mg/dL     CBC Auto Differential [[id number]]  (Abnormal) Collected: 10/26/24 1550    Specimen: Blood Updated: 10/26/24 1556     WBC 11.90 10*3/mm3      RBC 3.88 10*6/mm3      Hemoglobin 11.6 g/dL      Hematocrit 35.2 %      MCV 90.7 fL      MCH 29.9 pg      MCHC 33.0 g/dL      RDW 12.5 %      RDW-SD 40.9 fl      MPV 10.7 fL      Platelets 198 10*3/mm3      Neutrophil % 77.6 %      Lymphocyte % 16.5 %      Monocyte % 4.0 %      Eosinophil % 1.0 %      Basophil % 0.4 %      Immature Grans % 0.5 %      Neutrophils, Absolute 9.23 10*3/mm3      Lymphocytes, Absolute 1.96 10*3/mm3      Monocytes, Absolute 0.48 10*3/mm3      Eosinophils, Absolute 0.12 10*3/mm3      Basophils, Absolute 0.05 10*3/mm3      Immature Grans, Absolute 0.06 10*3/mm3      nRBC 0.0 /100 WBC     Urinalysis With Microscopic If Indicated (No Culture) - Urine, Clean Catch [[id number]]  (Abnormal) Collected: 10/26/24 1642    Specimen: Urine, Clean Catch Updated: 10/26/24 1650     Color, UA Yellow     Appearance, UA Clear     pH, UA 6.5     Specific Gravity, UA 1.010     Glucose, UA Negative     Ketones, UA 15 mg/dL (1+)     Bilirubin, UA Negative     Blood, UA Negative     Protein, UA Negative     Leuk Esterase, UA Negative     Nitrite, UA Negative     Urobilinogen, UA 0.2 E.U./dL    Narrative:      Urine microscopic not indicated.            US Ob Limited 1 + Fetuses   Final Result   1. Single intrauterine pregnancy with estimated gestational age of 19   weeks and 5 days in breech presentation. Fetal heart rate 142 bpm.   2. No visible sonographic complication.           This report was signed and finalized on 10/26/2024 4:23 PM by Sean Puentes.              Medications   sodium chloride 0.9 % flush 10 mL (has no administration in time range)   sodium chloride 0.9 % bolus 1,000 mL (0 mL Intravenous Stopped 10/26/24 1750)   ondansetron (ZOFRAN) injection 4 mg (4 mg Intravenous Given 10/26/24 1708)       Medical Decision  Making    I had a discussion with the patient/family regarding diagnosis, diagnostic results, treatment plan, and medications.  The patient/family indicated understanding of these instructions.  I spent adequate time at the bedside prior to discharge necessary to discuss the aftercare instructions, giving patient education, providing explanations of the results of our evaluations/findings, and my decision making to assure that the patient/family understand the plan of care.  Time was allotted to answer questions at that time and throughout the ED course.  Emphasis was placed on timely follow-up after discharge.  I also discussed the potential for the development of an acute emergent condition requiring further evaluation, return to the ER, admission, or even surgical intervention. I discussed that we found nothing during the visit today indicating the need for further ER workup at this time, admission to the hospital, or the presence of an acute unstable medical condition.  I encouraged the patient to return to the emergency department immediately for ANY concerns, worsening, new complaints, or if symptoms persist and unable to seek follow-up in a timely fashion.  The patient/family expressed understanding and agreement with this plan.      Problems Addressed:  19 weeks gestation of pregnancy: complicated acute illness or injury  Lower abdominal pain: complicated acute illness or injury    Amount and/or Complexity of Data Reviewed  Labs: ordered. Decision-making details documented in ED Course.  Radiology: ordered. Decision-making details documented in ED Course.    Risk  Prescription drug management.        Final diagnoses:   Lower abdominal pain   19 weeks gestation of pregnancy       ED Disposition  ED Disposition       ED Disposition   Discharge    Condition   Stable    Comment   --               Jsesica Rodriguez DO  [address]  [facility] [address]  [phone]    Schedule an appointment as  soon as possible for a visit       [facility] EMERGENCY DEPARTMENT  [address] [place] [id number] [phone]    As needed, If symptoms worsen         Medication List      No changes were made to your prescriptions during this visit.            Pino Chavez MD  10/26/24 4485

## 2024-10-26 NOTE — Clinical Note
[facility] EMERGENCY DEPARTMENT  [address]  [facility] [address]  Phone: [phone]    Elvia Aviles was seen and treated in our emergency department on 10/26/2024.  She may return to work on 10/29/2024.         Thank you for choosing [facility].    Pino Chavez MD

## 2024-10-26 NOTE — Clinical Note
[facility] EMERGENCY DEPARTMENT  [address]  [facility] [address]  Phone: [phone]    Elvia Aviles was seen and treated in our emergency department on 10/26/2024.  She may return to work on 10/29/2024.         Thank you for choosing [facility].    Pino Chavez MD

## 2024-12-19 ENCOUNTER — TELEPHONE (OUTPATIENT)
Dept: OBGYN CLINIC | Age: 29
End: 2024-12-19

## 2024-12-19 NOTE — TELEPHONE ENCOUNTER
Patient called stating she is unable to get PN records from previous OB in [facility].  She states she will come by the office tomorrow and sign a medical release form so the office can request her records. Patient has a appt on 12/23.     Thank you!

## 2024-12-19 NOTE — TELEPHONE ENCOUNTER
Patient called stating that she could not get her records and that she has to sign a release form in our office for them to send the records over. Patient is going to try to come in noon 12/20/2024.

## 2024-12-19 NOTE — TELEPHONE ENCOUNTER
New patient called to schedule a prenatal appointment with rivera romo, She is currently 28 weeks pregnancy and transferring care from [place]. Please return patient's call.

## 2024-12-23 ENCOUNTER — TELEPHONE (OUTPATIENT)
Dept: OBGYN CLINIC | Age: 29
End: 2024-12-23

## 2024-12-23 ENCOUNTER — INITIAL PRENATAL (OUTPATIENT)
Dept: OBGYN CLINIC | Age: 29
End: 2024-12-23

## 2024-12-23 VITALS — DIASTOLIC BLOOD PRESSURE: 73 MMHG | WEIGHT: 161 LBS | SYSTOLIC BLOOD PRESSURE: 110 MMHG | HEART RATE: 92 BPM

## 2024-12-23 DIAGNOSIS — Z76.89 ENCOUNTER TO ESTABLISH CARE: Primary | ICD-10-CM

## 2024-12-23 DIAGNOSIS — Z3A.28 28 WEEKS GESTATION OF PREGNANCY: ICD-10-CM

## 2024-12-23 DIAGNOSIS — Z34.03 ENCOUNTER FOR SUPERVISION OF NORMAL FIRST PREGNANCY, THIRD TRIMESTER: ICD-10-CM

## 2024-12-23 PROCEDURE — 99204 OFFICE O/P NEW MOD 45 MIN: CPT | Performed by: NURSE PRACTITIONER

## 2024-12-23 RX ORDER — ASPIRIN 325 MG
1 TABLET ORAL DAILY
COMMUNITY

## 2024-12-23 RX ORDER — DIPHENHYDRAMINE HCL 25 MG
25 TABLET ORAL EVERY 6 HOURS PRN
COMMUNITY

## 2024-12-23 NOTE — TELEPHONE ENCOUNTER
Patient has transferred from [place] to [place] and was told she had coverage in [place] but not [place] until January 1st 2025. As patient has not been seen for her prenatal visit since before 11/28/24, I called the Dept of Medicaid to confirm if she still had coverage or not. They stated she did not have coverage at all until January 1st. I informed patient of this and that since she has no coverage right now, that we can see her as a self pay today and recommend that as she needs seen asap. She said she ok to pay the fee and will come in today.  Dept of Medicaid Ref # [id number]

## 2024-12-23 NOTE — PATIENT INSTRUCTIONS
Patient Education        Weeks 26 to 30 of Your Pregnancy: Care Instructions  You're starting your last trimester. You'll probably feel your baby moving around more. Your back may ache as your body gets used to your baby's size and length. Take care of yourself, and pay attention to what your body needs.    Talk to your doctor about getting the Tdap shot. It will help protect your  against whooping cough (pertussis). Also ask your doctor about flu and COVID-19 shots if you haven't had them yet. If your blood type is Rh negative, you may be given a shot of Rh immune globulin (such as RhoGAM). It can help prevent problems for your baby.   You may have [place] contractions. They are single or several strong contractions without a pattern. These are practice contractions but not the start of labor.   Be kind to yourself.       Take breaks when you're tired.  Change positions often. Don't sit for too long or stand for too long.  At work, rest during breaks if you can. If you don't get breaks, talk to your doctor about writing a letter to your employer to request them.  Avoid fumes, chemicals, and tobacco smoke.  Be sexual if you want to.       You may be interested in sex, or you may not. Everyone is different.  Sex is okay unless your doctor tells you not to.  Your belly can make it hard to find good positions for sex. [place] and explore.  Watch for signs of  labor.        These signs include:  Menstrual-like cramps. Or you may have pain or pressure in your pelvis that happens in a pattern.  About 6 or more contractions in an hour (even after rest and a glass of water).  A low, dull backache that doesn't go away when you change positions.  An increase or change in vaginal discharge.  Light vaginal bleeding or spotting.  Your water breaking.  Know what to do if you think you are having contractions.       Drink 1 or 2 glasses of water.  Lie down on your left side for at least an hour.  While on

## 2024-12-23 NOTE — PROGRESS NOTES
Subjective:Dixie Mascorro is here for a new obstetrical visit, transfer care from [facility]. Today she is 28w0d weeks EGA. Has requested records on 2024. Per pt has had normal PNC up to this point with normal anatomical survey. Pt does feel fetal movement regularly.  Denies any bleeding or LOF. Having occasional cramping.     Problems/Complaints today:  Establish Care  Routine PNC  Objective:Mother's Prenatal Vitals  BP: 110/73  Weight - Scale: 73 kg (161 lb)  Pulse: 92  Patient Position: Sitting  Prenatal Fetal Information  Fundal Height (cm): 28 cm  Fetal HR: 165  Movement: Present  Pt is A&Ox3, in no acute distress. Normocephalic, atraumatic. PERRL. Resp even and non-labored. Skin pink, warm & dry. Gravid abdomen. GREGG's well. Gait steady.   Assessment:  IUP at 28w0d wks      Diagnosis Orders   1. Encounter to establish care        2. Encounter for supervision of normal first pregnancy, third trimester  Glucose 1 Hour Postprandial    CBC with Auto Differential      3. 28 weeks gestation of pregnancy  Glucose 1 Hour Postprandial    CBC with Auto Differential        Plan:  Problems/Complaints Management Plan:  Routine PNC- plan third trimester labs at next appt and gave instructions with pt understanding, anticipate prenatal records  Routine OB Management Plan:  Pt counseled on GHTN precautions, Kick count, and  labor  Continue with routine prenatal care.  RTC in 2 wk for prenatal visit    MEDICATIONS:  No orders of the defined types were placed in this encounter.      ORDERS:  Orders Placed This Encounter   Procedures    Glucose 1 Hour Postprandial    CBC with Auto Differential

## 2024-12-23 NOTE — PROGRESS NOTES
Pt presents today to establish care and for routine prenatal visit. Pt denies vaginal bleeding, cramping, or leaking of fluid +fetal movement. She is a transfer from [place], [place]. She is not sure who she wants to see for delivery. She is not sure what pharmacy she is going to use. She is taking the flinstone vitamins due to the prenatals making her sick. She states her parents were adopted. She is having a baby boy. She states her b/p did drop before and was in [facility] ER. She did have her anatomy scan and was normal.    A+ and dad O neg

## 2024-12-26 ENCOUNTER — HOSPITAL ENCOUNTER (OUTPATIENT)
Age: 29
Discharge: HOME | End: 2024-12-26
Attending: ADVANCED PRACTICE MIDWIFE | Admitting: ADVANCED PRACTICE MIDWIFE
Payer: COMMERCIAL

## 2024-12-26 VITALS
SYSTOLIC BLOOD PRESSURE: 111 MMHG | HEART RATE: 97 BPM | DIASTOLIC BLOOD PRESSURE: 67 MMHG | OXYGEN SATURATION: 100 % | RESPIRATION RATE: 20 BRPM | TEMPERATURE: 98.8 F

## 2024-12-26 PROBLEM — Z3A.28 28 WEEKS GESTATION OF PREGNANCY: Status: ACTIVE | Noted: 2024-12-26

## 2024-12-26 LAB
BACTERIA SPEC QL WET PREP: NORMAL
BACTERIA URNS QL MICRO: ABNORMAL /HPF
BILIRUB UR QL STRIP: NEGATIVE
CLARITY UR: ABNORMAL
CLUE CELLS VAG QL WET PREP: NORMAL
COLOR UR: ABNORMAL
CRYSTALS URNS MICRO: ABNORMAL /HPF
EPI CELLS #/AREA URNS AUTO: 8 /HPF (ref 0–5)
EPI CELLS VAG QL WET PREP: NORMAL
GLUCOSE UR STRIP.AUTO-MCNC: NEGATIVE MG/DL
HGB UR STRIP.AUTO-MCNC: NEGATIVE MG/L
HYALINE CASTS #/AREA URNS AUTO: 21 /HPF (ref 0–8)
KETONES UR STRIP.AUTO-MCNC: ABNORMAL MG/DL
LEUKOCYTE ESTERASE UR QL STRIP.AUTO: ABNORMAL
NITRITE UR QL STRIP.AUTO: NEGATIVE
PH UR STRIP.AUTO: 6.5 [PH] (ref 5–8)
PROT UR STRIP.AUTO-MCNC: 30 MG/DL
RBC #/AREA URNS AUTO: 5 /HPF (ref 0–4)
RBC VAG QL: NORMAL
SP GR UR STRIP.AUTO: 1.02 (ref 1–1.03)
SPECIMEN SOURCE FLD: NORMAL
T VAGINALIS VAG QL WET PREP: NORMAL
UROBILINOGEN UR STRIP.AUTO-MCNC: 1 E.U./DL
WBC #/AREA URNS AUTO: 9 /HPF (ref 0–5)
WBC VAG QL WET PREP: NORMAL
YEAST VAG QL WET PREP: NORMAL

## 2024-12-26 PROCEDURE — 96361 HYDRATE IV INFUSION ADD-ON: CPT

## 2024-12-26 PROCEDURE — 99214 OFFICE O/P EST MOD 30 MIN: CPT

## 2024-12-26 PROCEDURE — 2580000003 HC RX 258: Performed by: ADVANCED PRACTICE MIDWIFE

## 2024-12-26 PROCEDURE — 81001 URINALYSIS AUTO W/SCOPE: CPT

## 2024-12-26 PROCEDURE — 6360000002 HC RX W HCPCS: Performed by: ADVANCED PRACTICE MIDWIFE

## 2024-12-26 PROCEDURE — 96372 THER/PROPH/DIAG INJ SC/IM: CPT

## 2024-12-26 PROCEDURE — 96374 THER/PROPH/DIAG INJ IV PUSH: CPT

## 2024-12-26 PROCEDURE — 2500000003 HC RX 250 WO HCPCS: Performed by: ADVANCED PRACTICE MIDWIFE

## 2024-12-26 RX ORDER — SODIUM CHLORIDE, SODIUM LACTATE, POTASSIUM CHLORIDE, AND CALCIUM CHLORIDE .6; .31; .03; .02 G/100ML; G/100ML; G/100ML; G/100ML
1000 INJECTION, SOLUTION INTRAVENOUS ONCE
Status: COMPLETED | OUTPATIENT
Start: 2024-12-26 | End: 2024-12-26

## 2024-12-26 RX ORDER — TERBUTALINE SULFATE 1 MG/ML
0.25 INJECTION, SOLUTION SUBCUTANEOUS ONCE
Status: COMPLETED | OUTPATIENT
Start: 2024-12-26 | End: 2024-12-26

## 2024-12-26 RX ADMIN — SODIUM CHLORIDE, POTASSIUM CHLORIDE, SODIUM LACTATE AND CALCIUM CHLORIDE 1000 ML: 600; 310; 30; 20 INJECTION, SOLUTION INTRAVENOUS at 14:22

## 2024-12-26 RX ADMIN — WATER 1000 MG: 1 INJECTION INTRAMUSCULAR; INTRAVENOUS; SUBCUTANEOUS at 14:27

## 2024-12-26 RX ADMIN — TERBUTALINE SULFATE 0.25 MG: 1 INJECTION, SOLUTION SUBCUTANEOUS at 13:45

## 2024-12-26 NOTE — DISCHARGE INSTRUCTIONS
LABOR AND DELIVERY - OBSERVATION DISCHARGE INSTRUCTIONS    TERM LABOR: RETURN TO HOSPITAL IF:  __There is a leaking or sudden gush of fluid from the vagina (note color, odor, time and amount of fluid)    __ You have bright red vaginal bleeding    __You begin to have regular contractions, occuring every *** minutes, each contraction lasting 45-60 seconds for one hour. Time contractions from beginning of one to the beginning of the next. True contractions have a regular rate and become progressively closer. They are not changed by position change and are usually more uncomfortable when walking.    PRE-TERM LABOR  _x_Your gestational age is 28 weeks: term pregnancy starts at 37 weeks.  __Fill your prescription and take as directed.  __Bed rest (left lying position is best).  _x_Refrain from sexual intercourse until you see your physician again.  _x_No heavy lifting or strenuous activity.    RETURN TO HOSPITAL IF:  _x_Contractions occur 3-4 in any hour (every 10-15 minutes), maybe with or without pain.  _x_Rhythmic or constant menstrual-like cramps  _x_Rhythmic or constant lower, dull backache may radiate to the sides or front. Increase or change in vaginal discharge, may be watery, pink, red or brown-tinged.      NAUSEA/VOMITING HYPEREMESIS  __Eat small, frequent meals instead of three large meals  __Drink liquids (such as 7-up or ginger ale) between meals instead of with meals  __Eat a few crackers or toast before getting out of bed in the morning    URINARY TRACT INFECTION  __Fill your prescription and take as directed  _x_Drink 8-10 glasses of water, juice or decaffeinated beverages a day.  _x_Take two regular strength Tylenol every 4 hours as needed for pain or fever (NO ASPIRIN PRODUCTS)  _x_Cleanse vaginal area from front to back  _x_Completely empty bladder and avoid postponing urination  _x_Notify your physician of elevated temperature          OTHER INSTRUCTIONS  _x_Keep appointment to see your attending  physician       NOTE: If you do not begin to feel better, or you have any questions, contact your physician or call [phone], or return to the hospital.                     Consult: RUT Gonzalez provided Pt with written documentation for community resources based on Pt SDOH scores or any additional community resources needed. These resources will be provided to the Pt at D/C. Electronically signed by Lisa Crooks on 12/26/2024 at 2:12 PM

## 2024-12-26 NOTE — CARE COORDINATION
Consult: RUT Gonzalez provided Pt with written documentation for community resources based on Pt SDOH scores or any additional community resources needed. These resources will be provided to the Pt at D/C. Electronically signed by Lisa Crooks on 12/26/2024 at 2:12 PM

## 2024-12-26 NOTE — PROGRESS NOTES
Pt here from work with c/o low back pain, mid-upper abdominal pain, decrease fetal movement as well as vaginal discharge. Denies vaginal bleeding.     EFM applied.

## 2024-12-26 NOTE — FLOWSHEET NOTE
Written and verbal discharge instructions provided to patient. All questions answered and concerns addressed. Pt verbalized understanding. Pt encouraged to keep follow up appointment. Pt up to dress.

## 2024-12-28 ENCOUNTER — HOSPITAL ENCOUNTER (OUTPATIENT)
Age: 29
Discharge: HOME | End: 2024-12-28
Attending: OBSTETRICS & GYNECOLOGY | Admitting: OBSTETRICS & GYNECOLOGY

## 2024-12-28 VITALS
OXYGEN SATURATION: 100 % | TEMPERATURE: 97.6 F | DIASTOLIC BLOOD PRESSURE: 74 MMHG | HEIGHT: 63 IN | HEART RATE: 97 BPM | WEIGHT: 161 LBS | BODY MASS INDEX: 28.53 KG/M2 | RESPIRATION RATE: 18 BRPM | SYSTOLIC BLOOD PRESSURE: 106 MMHG

## 2024-12-28 LAB
AMNISURE, POC: NEGATIVE
Lab: NORMAL
NEGATIVE QC PASS/FAIL: NORMAL
POSITIVE QC PASS/FAIL: NORMAL

## 2024-12-28 PROCEDURE — 99213 OFFICE O/P EST LOW 20 MIN: CPT

## 2024-12-28 PROCEDURE — 96372 THER/PROPH/DIAG INJ SC/IM: CPT

## 2024-12-28 PROCEDURE — 6370000000 HC RX 637 (ALT 250 FOR IP): Performed by: OBSTETRICS & GYNECOLOGY

## 2024-12-28 PROCEDURE — 6360000002 HC RX W HCPCS: Performed by: OBSTETRICS & GYNECOLOGY

## 2024-12-28 PROCEDURE — 84112 EVAL AMNIOTIC FLUID PROTEIN: CPT

## 2024-12-28 RX ORDER — TERBUTALINE SULFATE 1 MG/ML
0.25 INJECTION, SOLUTION SUBCUTANEOUS ONCE
Status: COMPLETED | OUTPATIENT
Start: 2024-12-28 | End: 2024-12-28

## 2024-12-28 RX ORDER — NIFEDIPINE 10 MG/1
10 CAPSULE ORAL ONCE
Status: COMPLETED | OUTPATIENT
Start: 2024-12-28 | End: 2024-12-28

## 2024-12-28 RX ADMIN — NIFEDIPINE 10 MG: 10 CAPSULE ORAL at 09:39

## 2024-12-28 RX ADMIN — TERBUTALINE SULFATE 0.25 MG: 1 INJECTION, SOLUTION SUBCUTANEOUS at 11:44

## 2024-12-28 NOTE — DISCHARGE INSTRUCTIONS
LABOR AND DELIVERY - OBSERVATION DISCHARGE INSTRUCTIONS    TERM LABOR: RETURN TO HOSPITAL IF:  __There is a leaking or sudden gush of fluid from the vagina (note color, odor, time and amount of fluid)    __ You have bright red vaginal bleeding    __You begin to have regular contractions, occuring every 4-5 minutes, each contraction lasting 45-60 seconds for one hour. Time contractions from beginning of one to the beginning of the next. True contractions have a regular rate and become progressively closer. They are not changed by position change and are usually more uncomfortable when walking.    PRE-TERM LABOR  __Your gestational age is 28 weeks and 5 days: term pregnancy starts at 37 weeks.  __Fill your prescription and take as directed.  __Bed rest (left lying position is best).  __Refrain from sexual intercourse until you see your physician again.  __No heavy lifting or strenuous activity.    RETURN TO HOSPITAL IF:  __Contractions occur 3-4 in any hour (every 10-15 minutes), maybe with or without pain.  __Rhythmic or constant menstrual-like cramps  __Rhythmic or constant lower, dull backache may radiate to the sides or front. Increase or change in vaginal discharge, may be watery, pink, red or brown-tinged.    URINARY TRACT INFECTION  __Fill your prescription and take as directed  __Drink 8-10 glasses of water, juice or decaffeinated beverages a day.  __Take two regular strength Tylenol every 4 hours as needed for pain or fever (NO ASPIRIN PRODUCTS)  __Cleanse vaginal area from front to back  __Completely empty bladder and avoid postponing urination  __Notify your physician of elevated temperature      OTHER INSTRUCTIONS  __Make an appointment to see your attending physician   __After today's visit, you will need to return to registration and pre register for your next visit     NOTE: If you do not begin to feel better, or you have any questions, contact your physician or call [phone], or return to the  [facility].

## 2024-12-28 NOTE — PROGRESS NOTES
Patient arrived to unit via wheelchair. Gowned and placed on EFM. Patient states she was here on Thursday with similar symptoms and was found to have an UTI and was having contractions. Was treated with antibiotics on Thursday. Woke up this am at 0300 with back pain and rectal pressure. States was also feeling vaginal pressure. She states she felt \"wet\" like perhaps she had urinated. States was fearful that she was bleeding so she had her S.O. look and no blood was noted. States the vaginal/rectal pressure is constant but the pain comes and goes. States positive fetal movement. Also states she has developed a cough and a sore throat since being here on Thursday.

## 2024-12-28 NOTE — FLOWSHEET NOTE
Dr. Smith in the department at this time. This RN spoke to Dr. Smith regarding pt arrival and pt contractions. Per Dr. Smith orders were given for Procardia once and to perform a SVE.

## 2024-12-28 NOTE — FLOWSHEET NOTE
This RN called and spoke to Dr. Smith regarding pt having n/v and contractions every 2 to 4 minutes. Per Dr. Smith orders were given for Terbutaline.   None known

## 2024-12-28 NOTE — FLOWSHEET NOTE
This RN at the bedside at this time. Pt educated on discharge education and labor precautions. Pt educated to keep her follow up appointment. Pt verbalized understanding. All questions answered.

## 2024-12-31 ENCOUNTER — TELEPHONE (OUTPATIENT)
Dept: OBGYN CLINIC | Age: 29
End: 2024-12-31

## 2024-12-31 NOTE — TELEPHONE ENCOUNTER
Patient left message on nurse line stating she had been to  ER for contractions and felt they were returning. Informed patient that if she felt contractions were returning she should present to L&D.

## 2025-01-01 ENCOUNTER — HOSPITAL ENCOUNTER (OUTPATIENT)
Age: 30
Discharge: HOME | End: 2025-01-01
Attending: ADVANCED PRACTICE MIDWIFE | Admitting: OBSTETRICS & GYNECOLOGY
Payer: MEDICAID

## 2025-01-01 VITALS
OXYGEN SATURATION: 100 % | TEMPERATURE: 98.7 F | SYSTOLIC BLOOD PRESSURE: 127 MMHG | HEART RATE: 104 BPM | RESPIRATION RATE: 18 BRPM | DIASTOLIC BLOOD PRESSURE: 74 MMHG

## 2025-01-01 PROBLEM — Z3A.29 29 WEEKS GESTATION OF PREGNANCY: Status: ACTIVE | Noted: 2025-01-01

## 2025-01-01 LAB
ALBUMIN SERPL-MCNC: 3.3 G/DL (ref 3.5–5.2)
ALP SERPL-CCNC: 121 U/L (ref 35–104)
ALT SERPL-CCNC: 13 U/L (ref 5–33)
AMNISURE, POC: NEGATIVE
ANION GAP SERPL CALCULATED.3IONS-SCNC: 12 MMOL/L (ref 7–19)
AST SERPL-CCNC: 13 U/L (ref 5–32)
BACTERIA #/AREA URNS HPF: ABNORMAL /HPF
BILIRUB SERPL-MCNC: <0.2 MG/DL (ref 0.2–1.2)
BILIRUB UR QL STRIP: NEGATIVE
BUN SERPL-MCNC: 5 MG/DL (ref 6–20)
CALCIUM SERPL-MCNC: 8.2 MG/DL (ref 8.6–10)
CHLORIDE SERPL-SCNC: 102 MMOL/L (ref 98–111)
CLARITY UR: ABNORMAL
CO2 SERPL-SCNC: 21 MMOL/L (ref 22–29)
COLOR UR: YELLOW
CREAT SERPL-MCNC: 0.5 MG/DL (ref 0.5–0.9)
CRYSTALS URNS MICRO: ABNORMAL /HPF
ERYTHROCYTE [DISTWIDTH] IN BLOOD BY AUTOMATED COUNT: 12.8 % (ref 11.5–14.5)
GLUCOSE SERPL-MCNC: 101 MG/DL (ref 70–99)
GLUCOSE UR STRIP.AUTO-MCNC: NEGATIVE MG/DL
HCT VFR BLD AUTO: 32.7 % (ref 37–47)
HGB BLD-MCNC: 10.6 G/DL (ref 12–16)
HGB UR STRIP.AUTO-MCNC: NEGATIVE MG/L
KETONES UR STRIP.AUTO-MCNC: ABNORMAL MG/DL
LEUKOCYTE ESTERASE UR QL STRIP.AUTO: ABNORMAL
Lab: NORMAL
MCH RBC QN AUTO: 29.3 PG (ref 27–31)
MCHC RBC AUTO-ENTMCNC: 32.4 G/DL (ref 33–37)
MCV RBC AUTO: 90.3 FL (ref 81–99)
MUCOUS THREADS URNS QL MICRO: ABNORMAL /LPF
NEGATIVE QC PASS/FAIL: NORMAL
NITRITE UR QL STRIP.AUTO: NEGATIVE
PH UR STRIP.AUTO: 6 [PH] (ref 5–8)
PLATELET # BLD AUTO: 182 K/UL (ref 130–400)
PMV BLD AUTO: 10.3 FL (ref 9.4–12.3)
POSITIVE QC PASS/FAIL: NORMAL
POTASSIUM SERPL-SCNC: 3.3 MMOL/L (ref 3.5–5)
PROT SERPL-MCNC: 5.9 G/DL (ref 6.4–8.3)
PROT UR STRIP.AUTO-MCNC: ABNORMAL MG/DL
RBC # BLD AUTO: 3.62 M/UL (ref 4.2–5.4)
RBC #/AREA URNS HPF: ABNORMAL /HPF (ref 0–2)
SODIUM SERPL-SCNC: 135 MMOL/L (ref 136–145)
SP GR UR STRIP.AUTO: 1.02 (ref 1–1.03)
SQUAMOUS #/AREA URNS HPF: ABNORMAL /HPF
UROBILINOGEN UR STRIP.AUTO-MCNC: 1 E.U./DL
WBC # BLD AUTO: 9 K/UL (ref 4.8–10.8)
WBC #/AREA URNS HPF: ABNORMAL /HPF (ref 0–5)

## 2025-01-01 PROCEDURE — 80053 COMPREHEN METABOLIC PANEL: CPT

## 2025-01-01 PROCEDURE — 6360000002 HC RX W HCPCS: Performed by: OBSTETRICS & GYNECOLOGY

## 2025-01-01 PROCEDURE — 99213 OFFICE O/P EST LOW 20 MIN: CPT

## 2025-01-01 PROCEDURE — 36415 COLL VENOUS BLD VENIPUNCTURE: CPT

## 2025-01-01 PROCEDURE — 96372 THER/PROPH/DIAG INJ SC/IM: CPT

## 2025-01-01 PROCEDURE — 96360 HYDRATION IV INFUSION INIT: CPT

## 2025-01-01 PROCEDURE — 81001 URINALYSIS AUTO W/SCOPE: CPT

## 2025-01-01 PROCEDURE — 2580000003 HC RX 258: Performed by: OBSTETRICS & GYNECOLOGY

## 2025-01-01 PROCEDURE — 96361 HYDRATE IV INFUSION ADD-ON: CPT

## 2025-01-01 PROCEDURE — 85027 COMPLETE CBC AUTOMATED: CPT

## 2025-01-01 RX ORDER — TERBUTALINE SULFATE 1 MG/ML
0.25 INJECTION, SOLUTION SUBCUTANEOUS ONCE
Status: COMPLETED | OUTPATIENT
Start: 2025-01-01 | End: 2025-01-01

## 2025-01-01 RX ORDER — 0.9 % SODIUM CHLORIDE 0.9 %
2000 INTRAVENOUS SOLUTION INTRAVENOUS ONCE
Status: COMPLETED | OUTPATIENT
Start: 2025-01-01 | End: 2025-01-01

## 2025-01-01 RX ORDER — BETAMETHASONE SODIUM PHOSPHATE AND BETAMETHASONE ACETATE 3; 3 MG/ML; MG/ML
12 INJECTION, SUSPENSION INTRA-ARTICULAR; INTRALESIONAL; INTRAMUSCULAR; SOFT TISSUE ONCE
Status: COMPLETED | OUTPATIENT
Start: 2025-01-01 | End: 2025-01-01

## 2025-01-01 RX ORDER — SODIUM CHLORIDE 9 MG/ML
INJECTION, SOLUTION INTRAVENOUS CONTINUOUS
Status: DISCONTINUED | OUTPATIENT
Start: 2025-01-01 | End: 2025-01-01 | Stop reason: HOSPADM

## 2025-01-01 RX ADMIN — Medication 12 MG: at 16:23

## 2025-01-01 RX ADMIN — TERBUTALINE SULFATE 0.25 MG: 1 INJECTION, SOLUTION SUBCUTANEOUS at 16:21

## 2025-01-01 RX ADMIN — SODIUM CHLORIDE 2000 ML: 9 INJECTION, SOLUTION INTRAVENOUS at 14:39

## 2025-01-01 NOTE — FLOWSHEET NOTE
Pt ambulated to labor unit. Pt voided, given gown and assisted to bed. EFM and toco applied to soft, nontender abdomen. Pt states she started feeling ctx around 1000 on 12/31/24. Pt denies vaginal bleeding and confirms positive fetal movement. Pt states she has felt fluid leaking, Amnisure performed with negative result. Pt states she was here and received a UTI diagnosis last weekend.

## 2025-01-02 ENCOUNTER — HOSPITAL ENCOUNTER (OUTPATIENT)
Dept: LABOR AND DELIVERY | Age: 30
Discharge: HOME | End: 2025-01-02
Attending: ADVANCED PRACTICE MIDWIFE | Admitting: ADVANCED PRACTICE MIDWIFE
Payer: MEDICAID

## 2025-01-02 PROCEDURE — 6360000002 HC RX W HCPCS: Performed by: ADVANCED PRACTICE MIDWIFE

## 2025-01-02 PROCEDURE — 96372 THER/PROPH/DIAG INJ SC/IM: CPT

## 2025-01-02 PROCEDURE — 99211 OFF/OP EST MAY X REQ PHY/QHP: CPT

## 2025-01-02 RX ORDER — BETAMETHASONE SODIUM PHOSPHATE AND BETAMETHASONE ACETATE 3; 3 MG/ML; MG/ML
12 INJECTION, SUSPENSION INTRA-ARTICULAR; INTRALESIONAL; INTRAMUSCULAR; SOFT TISSUE ONCE
Status: DISCONTINUED | OUTPATIENT
Start: 2025-01-02 | End: 2025-01-02 | Stop reason: SDUPTHER

## 2025-01-02 RX ORDER — BETAMETHASONE SODIUM PHOSPHATE AND BETAMETHASONE ACETATE 3; 3 MG/ML; MG/ML
12 INJECTION, SUSPENSION INTRA-ARTICULAR; INTRALESIONAL; INTRAMUSCULAR; SOFT TISSUE ONCE
Status: COMPLETED | OUTPATIENT
Start: 2025-01-02 | End: 2025-01-02

## 2025-01-02 RX ADMIN — Medication 12 MG: at 15:59

## 2025-01-02 NOTE — FLOWSHEET NOTE
Discharge instructions reviewed with pt. Pt verbalized understanding and has no questions at this time. Pt voided, dressed independently and ambulated off unit with a strong steady gait.

## 2025-01-02 NOTE — DISCHARGE INSTRUCTIONS
LABOR AND DELIVERY - OBSERVATION DISCHARGE INSTRUCTIONS    PRE-TERM LABOR  _x_Your gestational age is 29 weeks: term pregnancy starts at 37 weeks.  __Fill your prescription and take as directed.  __Bed rest (left lying position is best).  __Refrain from sexual intercourse until you see your physician again.  _x_No heavy lifting or strenuous activity.    RETURN TO HOSPITAL IF:  _x_Contractions occur 3-4 in any hour (every 10-15 minutes), maybe with or without pain.  _x_Rhythmic or constant menstrual-like cramps  _x_Rhythmic or constant lower, dull backache may radiate to the sides or front. Increase or change in vaginal discharge, may be watery, pink, red or brown-tinged.      NAUSEA/VOMITING HYPEREMESIS  __Eat small, frequent meals instead of three large meals  __Drink liquids (such as 7-up or ginger ale) between meals instead of with meals  __Eat a few crackers or toast before getting out of bed in the morning    URINARY TRACT INFECTION  __Fill your prescription and take as directed  _x_Drink 8-10 glasses of water, juice or decaffeinated beverages a day.  _x_Take two regular strength Tylenol every 4 hours as needed for pain or fever (NO ASPIRIN PRODUCTS)  _x_Cleanse vaginal area from front to back  _x_Completely empty bladder and avoid postponing urination  __Notify your physician of elevated temperature    OTHER INSTRUCTIONS  __Return to Labor and Delivery tomorrow for 2nd dose of betamethasone.       NOTE: If you do not begin to feel better, or you have any questions, contact your physician or call [phone], or return to the hospital.

## 2025-01-07 ENCOUNTER — TELEPHONE (OUTPATIENT)
Dept: OBGYN CLINIC | Age: 30
End: 2025-01-07

## 2025-01-07 NOTE — TELEPHONE ENCOUNTER
Patient left message stating she had questions regarding glucose st and appointment tomorrow. Attempted to return call, no VM

## 2025-01-08 ENCOUNTER — ROUTINE PRENATAL (OUTPATIENT)
Dept: OBGYN CLINIC | Age: 30
End: 2025-01-08
Payer: MEDICAID

## 2025-01-08 VITALS
DIASTOLIC BLOOD PRESSURE: 78 MMHG | SYSTOLIC BLOOD PRESSURE: 113 MMHG | HEART RATE: 96 BPM | BODY MASS INDEX: 28.52 KG/M2 | WEIGHT: 161 LBS

## 2025-01-08 DIAGNOSIS — Z3A.30 30 WEEKS GESTATION OF PREGNANCY: Primary | ICD-10-CM

## 2025-01-08 DIAGNOSIS — Z34.03 ENCOUNTER FOR SUPERVISION OF NORMAL FIRST PREGNANCY, THIRD TRIMESTER: ICD-10-CM

## 2025-01-08 DIAGNOSIS — Z3A.28 28 WEEKS GESTATION OF PREGNANCY: ICD-10-CM

## 2025-01-08 PROBLEM — Z3A.29 29 WEEKS GESTATION OF PREGNANCY: Status: RESOLVED | Noted: 2025-01-01 | Resolved: 2025-01-08

## 2025-01-08 LAB
BASOPHILS # BLD: 0 K/UL (ref 0–0.2)
BASOPHILS NFR BLD: 0.2 % (ref 0–1)
EOSINOPHIL # BLD: 0.1 K/UL (ref 0–0.6)
EOSINOPHIL NFR BLD: 0.6 % (ref 0–5)
ERYTHROCYTE [DISTWIDTH] IN BLOOD BY AUTOMATED COUNT: 12.5 % (ref 11.5–14.5)
GLUCOSE 1H P MEAL SERPL-MCNC: 149 MG/DL (ref 75–140)
HCT VFR BLD AUTO: 36.5 % (ref 37–47)
HGB BLD-MCNC: 11.7 G/DL (ref 12–16)
IMM GRANULOCYTES # BLD: 0.3 K/UL
LYMPHOCYTES # BLD: 1.9 K/UL (ref 1.1–4.5)
LYMPHOCYTES NFR BLD: 14.4 % (ref 20–40)
MCH RBC QN AUTO: 29 PG (ref 27–31)
MCHC RBC AUTO-ENTMCNC: 32.1 G/DL (ref 33–37)
MCV RBC AUTO: 90.3 FL (ref 81–99)
MONOCYTES # BLD: 0.6 K/UL (ref 0–0.9)
MONOCYTES NFR BLD: 4.5 % (ref 0–10)
NEUTROPHILS # BLD: 10.2 K/UL (ref 1.5–7.5)
NEUTS SEG NFR BLD: 78.2 % (ref 50–65)
PLATELET # BLD AUTO: 270 K/UL (ref 130–400)
PMV BLD AUTO: 10.6 FL (ref 9.4–12.3)
RBC # BLD AUTO: 4.04 M/UL (ref 4.2–5.4)
WBC # BLD AUTO: 13.1 K/UL (ref 4.8–10.8)

## 2025-01-08 PROCEDURE — 99214 OFFICE O/P EST MOD 30 MIN: CPT | Performed by: ADVANCED PRACTICE MIDWIFE

## 2025-01-08 RX ORDER — NIFEDIPINE 10 MG/1
10 CAPSULE ORAL EVERY 4 HOURS PRN
Qty: 60 CAPSULE | Refills: 1 | Status: SHIPPED | OUTPATIENT
Start: 2025-01-08

## 2025-01-08 NOTE — PATIENT INSTRUCTIONS
seats, call the National Highway Traffic Safety Administration at 1-207.724.5164.  Baby care items  Start stocking up on disposable diapers (unless you plan to use cloth diapers) and wipes. If you want, you can buy a few packages of  diapers, which come with a cutout in the front so the diaper does not touch the baby's umbilical cord stump. You also can buy regular infant diapers and roll down the front.  You may get some baby clothes from family and friends at baby showers and after the baby arrives. Ask for (or buy) a few basics to get you started. Get several sleep sacks or nightgowns, T-shirts that snap at the crotch (called \"onesies\"), small blankets to wrap the baby in (called receiving blankets), and one-piece outfits that snap or zip down one leg. This is so that you do not have to take off all the baby's clothes to change a diaper. Socks or booties are also a good idea to keep your baby's feet warm. Get a cotton cap or two. Newborns also need their heads covered to stay warm.  Put together a kit of baby care items. Include diaper rash cream, baby nail clippers, a nasal bulb syringe (to help clear a stuffy nose), and baby wash, which also can be used as shampoo.  If you plan to breastfeed, buy a couple of nursing bras. You can wear one while the other one is in the wash. Also, you may want get a nipple cream that contains lanolin. Some women also like to put nursing pads in their bras to prevent breast milk from leaking onto their clothes.  If you plan to bottle-feed, buy several cans of formula and several bottles to get you started.  Where can you learn more?  Go to https://www.[employer].net/patientEd and enter T918 to learn more about \"Getting Ready for Baby: Care Instructions.\"  Current as of: July 10, 2023               Content Version: 14.0  © 7921-9841 Healthwise, Incorporated.   Care instructions adapted under license by [employer]. If you have questions about a medical condition or this

## 2025-01-08 NOTE — PROGRESS NOTES
CNM Prenatal Office Note  Subjective:  Dixie Mascorro is here for a return obstetrical visit. Today she is 30w2d weeks EGA.  Pt does feel fetal movement regularly. Denies headaches, RUQ pain, or visual changes as well as contractions, vaginal bleeding or leaking of fluid.     Problems/complaints today:   contractions  Objective:  Mother's Prenatal Vitals  BP: 113/78  Weight - Scale: 73 kg (161 lb)  Pulse: 96  Patient Position: Sitting  Prenatal Fetal Information  Movement: Present  Pt is A&Ox3, in no acute distress. Normocephalic, atraumatic. PERRL. Resp even and non-labored. Skin pink, warm & dry. Gravid abdomen. GREGG's well. Gait steady.   Assessment:    IUP at 30w2d wks      Diagnosis Orders   1. 30 weeks gestation of pregnancy  NIFEdipine (PROCARDIA) 10 MG immediate release capsule      2. Encounter for supervision of normal first pregnancy, third trimester  NIFEdipine (PROCARDIA) 10 MG immediate release capsule        Plan:  Problems/Complaints Management Plan:   contractions - recommend procardia 10mg q 4-6 hours for pain.   Continue PNV daily.  Routine OB Management Plan:  Pt counseled on balanced nutrition, adequate fluid intake, taking PNV daily, and exercise along with GHTN precautions, Kick count, and  labor  Continue with routine prenatal care.   surveillance not indicated  RTC in 2 wks for prenatal visit    MEDICATIONS:  Orders Placed This Encounter   Medications    NIFEdipine (PROCARDIA) 10 MG immediate release capsule     Sig: Take 1 capsule by mouth every 4 hours as needed (contractions)     Dispense:  60 capsule     Refill:  1     ORDERS:  Orders Placed This Encounter   Procedures    N. Gonorrhoeae, External Result    Hepatitis C Antibody, External Result    HIV, External Result    RPR, External Lab    Hepatitis B, External Result    Rubella Titer, External Result    Rh Factor, External Result    ABO, External Result

## 2025-01-09 DIAGNOSIS — O99.810 ABNORMAL GLUCOSE COMPLICATING PREGNANCY: Primary | ICD-10-CM

## 2025-01-12 ENCOUNTER — HOSPITAL ENCOUNTER (OUTPATIENT)
Age: 30
Discharge: HOME | End: 2025-01-12
Attending: OBSTETRICS & GYNECOLOGY | Admitting: OBSTETRICS & GYNECOLOGY
Payer: MEDICAID

## 2025-01-12 VITALS
BODY MASS INDEX: 28.53 KG/M2 | HEART RATE: 97 BPM | DIASTOLIC BLOOD PRESSURE: 57 MMHG | RESPIRATION RATE: 20 BRPM | WEIGHT: 161 LBS | OXYGEN SATURATION: 100 % | TEMPERATURE: 97.8 F | SYSTOLIC BLOOD PRESSURE: 109 MMHG | HEIGHT: 63 IN

## 2025-01-12 PROBLEM — O47.9 UTERINE CONTRACTIONS DURING PREGNANCY: Status: ACTIVE | Noted: 2025-01-12

## 2025-01-12 LAB
AMNISURE, POC: NEGATIVE
BACTERIA URNS QL MICRO: NORMAL /HPF
BILIRUB UR QL STRIP: NEGATIVE
CLARITY UR: CLEAR
COLOR UR: YELLOW
CRYSTALS URNS MICRO: NORMAL /HPF
EPI CELLS #/AREA URNS AUTO: 5 /HPF (ref 0–5)
GLUCOSE UR STRIP.AUTO-MCNC: NEGATIVE MG/DL
HGB UR STRIP.AUTO-MCNC: NEGATIVE MG/L
HYALINE CASTS #/AREA URNS AUTO: 3 /HPF (ref 0–8)
KETONES UR STRIP.AUTO-MCNC: NEGATIVE MG/DL
LEUKOCYTE ESTERASE UR QL STRIP.AUTO: ABNORMAL
Lab: NORMAL
NEGATIVE QC PASS/FAIL: NORMAL
NITRITE UR QL STRIP.AUTO: NEGATIVE
PH UR STRIP.AUTO: 7 [PH] (ref 5–8)
POSITIVE QC PASS/FAIL: NORMAL
PROT UR STRIP.AUTO-MCNC: NEGATIVE MG/DL
RBC #/AREA URNS AUTO: 1 /HPF (ref 0–4)
SP GR UR STRIP.AUTO: 1.01 (ref 1–1.03)
UROBILINOGEN UR STRIP.AUTO-MCNC: 0.2 E.U./DL
WBC #/AREA URNS AUTO: 3 /HPF (ref 0–5)

## 2025-01-12 PROCEDURE — 84112 EVAL AMNIOTIC FLUID PROTEIN: CPT

## 2025-01-12 PROCEDURE — 81001 URINALYSIS AUTO W/SCOPE: CPT

## 2025-01-12 PROCEDURE — 99213 OFFICE O/P EST LOW 20 MIN: CPT

## 2025-01-12 NOTE — ED NOTES
History and Physical    Patient's Name/Date of Birth: Dixie Mascorro / 1995, (29 y.o.), female    Date: 2025     Chief Complaint:  contractions    HPI:   30 yo  at 31 weeks, returns to labor and delivery with contractions, brant discharge, no bleeding, good fetal mov  Pt had no medical problems, prenatal care remarkable for  contractions last week, received steroids.  Today denies fever or chills, no n/v, no  headaches, no uti, uri cns or covid symptoms    No past medical history on file.    Past Surgical History:   Procedure Laterality Date    CHOLECYSTECTOMY      age 18    CHOLECYSTECTOMY         No current facility-administered medications for this encounter.       No Known Allergies    Family History   Problem Relation Age of Onset    Other Mother     Breast Cancer Mother     Cancer Maternal Grandmother     Diabetes Maternal Grandmother     Parkinson's Disease Paternal Grandfather     Diabetes Maternal Great Grandmother        Social History     Socioeconomic History    Marital status: Single     Spouse name: Not on file    Number of children: Not on file    Years of education: Not on file    Highest education level: Not on file   Occupational History    Not on file   Tobacco Use    Smoking status: Never    Smokeless tobacco: Never   Vaping Use    Vaping status: Former   Substance and Sexual Activity    Alcohol use: Not Currently    Drug use: Never    Sexual activity: Yes   Other Topics Concern    Not on file   Social History Narrative    Not on file     Social Determinants of Health     Financial Resource Strain: Not on file   Food Insecurity: Not on file   Transportation Needs: Not on file   Physical Activity: Not on file   Stress: Not on file   Social Connections: Unknown (10/11/2023)    Received from [facility]    Family and Community Support     Help with Day-to-Day Activities: Not on file     Lonely or Isolated: Not on file   Intimate Partner Violence: Not At

## 2025-01-12 NOTE — DISCHARGE INSTRUCTIONS
LABOR AND DELIVERY - OBSERVATION DISCHARGE INSTRUCTIONS    TERM LABOR: RETURN TO HOSPITAL IF:  __There is a leaking or sudden gush of fluid from the vagina (note color, odor, time and amount of fluid)    __ You have bright red vaginal bleeding    __You begin to have regular contractions, occuring every 4-5 minutes, each contraction lasting 45-60 seconds for one hour. Time contractions from beginning of one to the beginning of the next. True contractions have a regular rate and become progressively closer. They are not changed by position change and are usually more uncomfortable when walking.    PRE-TERM LABOR  __Your gestational age is 30.6 weeks: term pregnancy starts at 37 weeks.  __Fill your prescription and take as directed.  __Bed rest (left lying position is best).  __Refrain from sexual intercourse until you see your physician again.  __No heavy lifting or strenuous activity.    RETURN TO HOSPITAL IF:  __Contractions occur 3-4 in any hour (every 10-15 minutes), maybe with or without pain.  __Rhythmic or constant menstrual-like cramps  __Rhythmic or constant lower, dull backache may radiate to the sides or front. Increase or change in vaginal discharge, may be watery, pink, red or brown-tinged.    .    OTHER INSTRUCTIONS  __Keep appointment to see your attending physician for all your pregnancy needs       NOTE: If you do not begin to feel better, or you have any questions, contact your physician or call [phone], or return to the hospital.

## 2025-01-12 NOTE — FLOWSHEET NOTE
Dr Garcia updated regarding pt's contractions, order received to provide pt with a Gatorade and discharge home. Pt provided with Gatorade to drink and instructed to call RN when she is finished, pt stated understanding.

## 2025-01-12 NOTE — FLOWSHEET NOTE
Discharge instructions provided and reviewed, pt states understanding with all questions answered.

## 2025-01-12 NOTE — FLOWSHEET NOTE
Pt ambulated to labor and delivery unit alone, states \"I am here for contractions, that started yesterday around 4 pm\" denies leaking of fluid, denies vaginal bleeding, + fetal movement, TOCO Aand EFM placed to soft non-tender abd, Pt states \"I have been having UTI's and I have not been taking my procardia. I have also noticed some discharge that is watery\"

## 2025-01-15 DIAGNOSIS — O99.810 ABNORMAL GLUCOSE COMPLICATING PREGNANCY: ICD-10-CM

## 2025-01-15 LAB
GLUCOSE 2H P 100 G GLC PO SERPL-MCNC: 117 MG/DL (ref 75–165)
GLUCOSE 3H P 100 G GLC PO SERPL-MCNC: 82 MG/DL (ref 75–145)
GLUCOSE BS SERPL-MCNC: 77 MG/DL (ref 75–110)
GLUCOSE TOLERANCE TEST 1 HOUR: 141 MG/DL (ref 75–190)

## 2025-01-17 ENCOUNTER — TELEPHONE (OUTPATIENT)
Dept: OBGYN CLINIC | Age: 30
End: 2025-01-17

## 2025-01-17 NOTE — TELEPHONE ENCOUNTER
Normal 3 hour- does not have mychart. Please call and let her know that she passed and does not have gestational diabetes. Thanks ML       Pt informed. Isha/LIYAH

## 2025-01-22 ENCOUNTER — ROUTINE PRENATAL (OUTPATIENT)
Dept: OBGYN CLINIC | Age: 30
End: 2025-01-22
Payer: MEDICAID

## 2025-01-22 VITALS
WEIGHT: 168 LBS | DIASTOLIC BLOOD PRESSURE: 72 MMHG | BODY MASS INDEX: 29.76 KG/M2 | HEART RATE: 87 BPM | SYSTOLIC BLOOD PRESSURE: 103 MMHG

## 2025-01-22 DIAGNOSIS — Z34.03 ENCOUNTER FOR SUPERVISION OF NORMAL FIRST PREGNANCY, THIRD TRIMESTER: ICD-10-CM

## 2025-01-22 DIAGNOSIS — R12 HEARTBURN DURING PREGNANCY IN THIRD TRIMESTER: ICD-10-CM

## 2025-01-22 DIAGNOSIS — O26.893 HEARTBURN DURING PREGNANCY IN THIRD TRIMESTER: ICD-10-CM

## 2025-01-22 DIAGNOSIS — Z3A.32 32 WEEKS GESTATION OF PREGNANCY: Primary | ICD-10-CM

## 2025-01-22 PROCEDURE — 99214 OFFICE O/P EST MOD 30 MIN: CPT | Performed by: ADVANCED PRACTICE MIDWIFE

## 2025-01-22 RX ORDER — PANTOPRAZOLE SODIUM 20 MG/1
20 TABLET, DELAYED RELEASE ORAL
Qty: 30 TABLET | Refills: 5 | Status: SHIPPED | OUTPATIENT
Start: 2025-01-22

## 2025-01-22 NOTE — PROGRESS NOTES
[facility] Prenatal Office Note  Subjective:  Dixie Mascorro is here for a return obstetrical visit. Today she is 32w2d weeks EGA.  Pt does feel fetal movement regularly. Denies headaches, RUQ pain, or visual changes as well as contractions, vaginal bleeding or leaking of fluid.     Problems/complaints today:  Heartburn  Objective:  Mother's Prenatal Vitals  BP: 103/72  Weight - Scale: 76.2 kg (168 lb)  Pulse: 87  Patient Position: Sitting  Prenatal Fetal Information  Fundal Height (cm): 34 cm  Fetal HR: 128  Movement: Present  Pt is A&Ox3, in no acute distress. Normocephalic, atraumatic. PERRL. Resp even and non-labored. Skin pink, warm & dry. Gravid abdomen. GREGG's well. Gait steady.   Assessment:    IUP at 32w2d wks      Diagnosis Orders   1. 32 weeks gestation of pregnancy  pantoprazole (PROTONIX) 20 MG tablet      2. Encounter for supervision of normal first pregnancy, third trimester        3. Heartburn during pregnancy in third trimester  pantoprazole (PROTONIX) 20 MG tablet        Plan:  Problems/Complaints Management Plan:  Heartburn- Start Protonix 20mg daily  Continue current PNV dose.   Routine OB Management Plan:  Pt counseled on balanced nutrition, adequate fluid intake, taking PNV daily, and exercise along with GHTN precautions, Kick count, and  labor  Continue with routine prenatal care.   surveillance not indicated  RTC in 2 wks for prenatal visit    MEDICATIONS:  Orders Placed This Encounter   Medications    pantoprazole (PROTONIX) 20 MG tablet     Sig: Take 1 tablet by mouth every morning (before breakfast)     Dispense:  30 tablet     Refill:  5     ORDERS:  No orders of the defined types were placed in this encounter.        I Laurie Oliveros RN, am scribing for and in the presence of Shea Lewis CNM  25  4:07 PM CST   I have seen and examined the patient independently.  I reviewed all laboratory and imaging studies that are relevant.  I have reviewed and made any appropriate

## 2025-01-23 ENCOUNTER — TELEPHONE (OUTPATIENT)
Dept: OBGYN CLINIC | Age: 30
End: 2025-01-23

## 2025-01-23 NOTE — TELEPHONE ENCOUNTER
Called and informed pt of growth ultrasound date and time. Growth ultrasound at the  group on  at 3:15pm. Pt voiced understanding.

## 2025-01-30 ENCOUNTER — HOSPITAL ENCOUNTER (OUTPATIENT)
Age: 30
Discharge: HOME | End: 2025-01-30
Attending: OBSTETRICS & GYNECOLOGY | Admitting: OBSTETRICS & GYNECOLOGY
Payer: MEDICAID

## 2025-01-30 VITALS
HEIGHT: 63 IN | HEART RATE: 86 BPM | OXYGEN SATURATION: 100 % | WEIGHT: 169.7 LBS | RESPIRATION RATE: 16 BRPM | BODY MASS INDEX: 30.07 KG/M2 | DIASTOLIC BLOOD PRESSURE: 76 MMHG | TEMPERATURE: 97.9 F | SYSTOLIC BLOOD PRESSURE: 102 MMHG

## 2025-01-30 DIAGNOSIS — Z3A.33 33 WEEKS GESTATION OF PREGNANCY: ICD-10-CM

## 2025-01-30 DIAGNOSIS — Z3A.33 33 WEEKS GESTATION OF PREGNANCY: Primary | ICD-10-CM

## 2025-01-30 LAB
AMNISURE, POC: NEGATIVE
BACTERIA URNS QL MICRO: NEGATIVE /HPF
BILIRUB UR QL STRIP: NEGATIVE
CLARITY UR: CLEAR
COLOR UR: YELLOW
CRYSTALS URNS MICRO: NORMAL /HPF
EPI CELLS #/AREA URNS AUTO: 3 /HPF (ref 0–5)
GLUCOSE UR STRIP.AUTO-MCNC: NEGATIVE MG/DL
HGB UR STRIP.AUTO-MCNC: NEGATIVE MG/L
HYALINE CASTS #/AREA URNS AUTO: 1 /HPF (ref 0–8)
KETONES UR STRIP.AUTO-MCNC: NEGATIVE MG/DL
LEUKOCYTE ESTERASE UR QL STRIP.AUTO: ABNORMAL
Lab: NORMAL
NEGATIVE QC PASS/FAIL: NORMAL
NITRITE UR QL STRIP.AUTO: NEGATIVE
PH UR STRIP.AUTO: 7.5 [PH] (ref 5–8)
POSITIVE QC PASS/FAIL: NORMAL
PROT UR STRIP.AUTO-MCNC: NEGATIVE MG/DL
RBC #/AREA URNS AUTO: 1 /HPF (ref 0–4)
SP GR UR STRIP.AUTO: 1.01 (ref 1–1.03)
UROBILINOGEN UR STRIP.AUTO-MCNC: 0.2 E.U./DL
WBC #/AREA URNS AUTO: 5 /HPF (ref 0–5)

## 2025-01-30 PROCEDURE — 87086 URINE CULTURE/COLONY COUNT: CPT

## 2025-01-30 PROCEDURE — 81001 URINALYSIS AUTO W/SCOPE: CPT

## 2025-01-30 PROCEDURE — 99212 OFFICE O/P EST SF 10 MIN: CPT

## 2025-01-30 PROCEDURE — 84112 EVAL AMNIOTIC FLUID PROTEIN: CPT

## 2025-01-30 NOTE — FLOWSHEET NOTE
Pt arrived to labor unit in a wheelchair. Pt voided and ambulated to triage room and assisted to bed. EFM and toco placed on soft, nontender abdomen. Pt confirms positive fetal movement and denies vaginal bleeding. Pt states her ctx started last night around 2000, she took her procardia and went to bed. She woke up again around 0430 with back and lower abdominal pain that was more intense and 'different'. She also noted her underwear was wet. Amnisure completed with negative results.

## 2025-01-30 NOTE — DISCHARGE INSTRUCTIONS
LABOR AND DELIVERY - OBSERVATION DISCHARGE INSTRUCTIONS    PRE-TERM LABOR  _x_Your gestational age is 33 weeks: term pregnancy starts at 37 weeks.  __Fill your prescription and take as directed.  __Bed rest (left lying position is best).  _x_Refrain from sexual intercourse until you see your physician again.  __No heavy lifting or strenuous activity.    RETURN TO HOSPITAL IF:  _x_Contractions occur 3-4 in any hour (every 10-15 minutes), maybe with or without pain.  _x_Rhythmic or constant menstrual-like cramps  _x_Rhythmic or constant lower, dull backache may radiate to the sides or front. Increase or change in vaginal discharge, may be watery, pink, red or brown-tinged.      URINARY TRACT INFECTION  __Fill your prescription and take as directed  _x_Drink 8-10 glasses of water, juice or decaffeinated beverages a day.  _x_Take two regular strength Tylenol every 4 hours as needed for pain or fever (NO ASPIRIN PRODUCTS)  _x_Cleanse vaginal area from front to back  _x_Completely empty bladder and avoid postponing urination  __Notify your physician of elevated temperature      OTHER INSTRUCTIONS  _x_Keep appointment to see your attending physician       NOTE: If you do not begin to feel better, or you have any questions, contact your physician or call [phone], or return to the hospital.

## 2025-01-30 NOTE — FLOWSHEET NOTE
Discharge instructions reviewed with patient. Pt verbalizes understanding and has no questions at this time.

## 2025-02-01 LAB — BACTERIA UR CULT: NORMAL

## 2025-02-12 ENCOUNTER — ROUTINE PRENATAL (OUTPATIENT)
Dept: OBGYN CLINIC | Age: 30
End: 2025-02-12
Payer: MEDICAID

## 2025-02-12 VITALS
BODY MASS INDEX: 30.47 KG/M2 | HEART RATE: 84 BPM | WEIGHT: 172 LBS | DIASTOLIC BLOOD PRESSURE: 77 MMHG | SYSTOLIC BLOOD PRESSURE: 109 MMHG

## 2025-02-12 DIAGNOSIS — Z34.03 ENCOUNTER FOR SUPERVISION OF NORMAL FIRST PREGNANCY, THIRD TRIMESTER: Primary | ICD-10-CM

## 2025-02-12 DIAGNOSIS — Z11.3 SCREEN FOR STD (SEXUALLY TRANSMITTED DISEASE): ICD-10-CM

## 2025-02-12 DIAGNOSIS — Z3A.35 35 WEEKS GESTATION OF PREGNANCY: ICD-10-CM

## 2025-02-12 DIAGNOSIS — Z36.85 ANTENATAL SCREENING FOR STREPTOCOCCUS B: ICD-10-CM

## 2025-02-12 LAB
C TRACH DNA CVX QL NAA+PROBE: NOT DETECTED
N GONORRHOEA DNA CERV MUCUS QL NAA+PROBE: NOT DETECTED
T VAGINALIS DNA GENITAL QL NAA+PROBE: NOT DETECTED

## 2025-02-12 PROCEDURE — 99213 OFFICE O/P EST LOW 20 MIN: CPT | Performed by: ADVANCED PRACTICE MIDWIFE

## 2025-02-12 SDOH — ECONOMIC STABILITY: FOOD INSECURITY: WITHIN THE PAST 12 MONTHS, YOU WORRIED THAT YOUR FOOD WOULD RUN OUT BEFORE YOU GOT MONEY TO BUY MORE.: SOMETIMES TRUE

## 2025-02-12 SDOH — ECONOMIC STABILITY: FOOD INSECURITY: WITHIN THE PAST 12 MONTHS, THE FOOD YOU BOUGHT JUST DIDN'T LAST AND YOU DIDN'T HAVE MONEY TO GET MORE.: NEVER TRUE

## 2025-02-12 ASSESSMENT — PATIENT HEALTH QUESTIONNAIRE - PHQ9
SUM OF ALL RESPONSES TO PHQ QUESTIONS 1-9: 15
SUM OF ALL RESPONSES TO PHQ9 QUESTIONS 1 & 2: 4
10. IF YOU CHECKED OFF ANY PROBLEMS, HOW DIFFICULT HAVE THESE PROBLEMS MADE IT FOR YOU TO DO YOUR WORK, TAKE CARE OF THINGS AT HOME, OR GET ALONG WITH OTHER PEOPLE: VERY DIFFICULT
7. TROUBLE CONCENTRATING ON THINGS, SUCH AS READING THE NEWSPAPER OR WATCHING TELEVISION: MORE THAN HALF THE DAYS
5. POOR APPETITE OR OVEREATING: MORE THAN HALF THE DAYS
1. LITTLE INTEREST OR PLEASURE IN DOING THINGS: MORE THAN HALF THE DAYS
6. FEELING BAD ABOUT YOURSELF - OR THAT YOU ARE A FAILURE OR HAVE LET YOURSELF OR YOUR FAMILY DOWN: SEVERAL DAYS
SUM OF ALL RESPONSES TO PHQ QUESTIONS 1-9: 15
4. FEELING TIRED OR HAVING LITTLE ENERGY: MORE THAN HALF THE DAYS
8. MOVING OR SPEAKING SO SLOWLY THAT OTHER PEOPLE COULD HAVE NOTICED. OR THE OPPOSITE, BEING SO FIGETY OR RESTLESS THAT YOU HAVE BEEN MOVING AROUND A LOT MORE THAN USUAL: MORE THAN HALF THE DAYS
SUM OF ALL RESPONSES TO PHQ QUESTIONS 1-9: 15
3. TROUBLE FALLING OR STAYING ASLEEP: MORE THAN HALF THE DAYS
9. THOUGHTS THAT YOU WOULD BE BETTER OFF DEAD, OR OF HURTING YOURSELF: NOT AT ALL
SUM OF ALL RESPONSES TO PHQ QUESTIONS 1-9: 15
2. FEELING DOWN, DEPRESSED OR HOPELESS: MORE THAN HALF THE DAYS

## 2025-02-12 NOTE — PROGRESS NOTES
Pt presents today for routine prenatal visit. Pt denies vaginal bleeding, cramping, or leaking of fluid +fetal movement.     Pt c/o hurting a lot more.   
encounter.    ORDERS:  Orders Placed This Encounter   Procedures    Chlamydia, Gonorrhea, Trichomoniasis    Strep B DNA probe, amplification

## 2025-02-13 ENCOUNTER — HOSPITAL ENCOUNTER (OUTPATIENT)
Age: 30
Setting detail: OBSERVATION
Discharge: HOME | End: 2025-02-13
Attending: OBSTETRICS & GYNECOLOGY | Admitting: ADVANCED PRACTICE MIDWIFE
Payer: MEDICAID

## 2025-02-13 VITALS
RESPIRATION RATE: 16 BRPM | OXYGEN SATURATION: 98 % | HEART RATE: 98 BPM | SYSTOLIC BLOOD PRESSURE: 114 MMHG | DIASTOLIC BLOOD PRESSURE: 75 MMHG | TEMPERATURE: 98 F

## 2025-02-13 PROBLEM — O47.9 UTERINE CONTRACTIONS: Status: ACTIVE | Noted: 2025-02-13

## 2025-02-13 LAB
AMNISURE, POC: NEGATIVE
BACTERIA SPEC QL WET PREP: ABNORMAL
BACTERIA URNS QL MICRO: ABNORMAL /HPF
BILIRUB UR QL STRIP: NEGATIVE
CLARITY UR: CLEAR
CLUE CELLS VAG QL WET PREP: ABNORMAL
COLOR UR: ABNORMAL
CRYSTALS URNS MICRO: ABNORMAL /HPF
EPI CELLS VAG QL WET PREP: ABNORMAL
GLUCOSE UR STRIP.AUTO-MCNC: NEGATIVE MG/DL
GP B STREP DNA SPEC QL NAA+PROBE: NOT DETECTED
HGB UR STRIP.AUTO-MCNC: ABNORMAL MG/L
KETONES UR STRIP.AUTO-MCNC: ABNORMAL MG/DL
LEUKOCYTE ESTERASE UR QL STRIP.AUTO: ABNORMAL
Lab: NORMAL
MUCOUS THREADS URNS QL MICRO: ABNORMAL /LPF
NEGATIVE QC PASS/FAIL: NORMAL
NITRITE UR QL STRIP.AUTO: NEGATIVE
PH UR STRIP.AUTO: 6.5 [PH] (ref 5–8)
POSITIVE QC PASS/FAIL: NORMAL
PROT UR STRIP.AUTO-MCNC: 30 MG/DL
RBC #/AREA URNS HPF: ABNORMAL /HPF (ref 0–2)
RBC VAG QL: ABNORMAL
SP GR UR STRIP.AUTO: 1.03 (ref 1–1.03)
SPECIMEN SOURCE FLD: ABNORMAL
SQUAMOUS #/AREA URNS HPF: ABNORMAL /HPF
T VAGINALIS VAG QL WET PREP: ABNORMAL
UROBILINOGEN UR STRIP.AUTO-MCNC: 1 E.U./DL
WBC #/AREA URNS HPF: ABNORMAL /HPF (ref 0–5)
WBC VAG QL WET PREP: ABNORMAL
YEAST VAG QL WET PREP: ABNORMAL

## 2025-02-13 PROCEDURE — 84112 EVAL AMNIOTIC FLUID PROTEIN: CPT

## 2025-02-13 PROCEDURE — 81001 URINALYSIS AUTO W/SCOPE: CPT

## 2025-02-13 PROCEDURE — 87086 URINE CULTURE/COLONY COUNT: CPT

## 2025-02-13 PROCEDURE — G0379 DIRECT REFER HOSPITAL OBSERV: HCPCS

## 2025-02-13 PROCEDURE — 99213 OFFICE O/P EST LOW 20 MIN: CPT

## 2025-02-13 PROCEDURE — G0378 HOSPITAL OBSERVATION PER HR: HCPCS

## 2025-02-13 NOTE — FLOWSHEET NOTE
Patient arrives to the floor complaining of intermittent cramping in her abdomen and her lower back. She reports being checked at the office yesterday and states it started yesterday after the appointment. She reports new discharge when coughing and has since has intermittent leaking. Yesterday, the discharge had a slight bloody tint/smear. No bright red bleeding or gush of fluid. Ambulates well. A & O x3. Reports she did go to work today and was busy, unable to drink fluids. Last procardia dosage at 9 am per patient.  Reports positive fetal movement.

## 2025-02-13 NOTE — FLOWSHEET NOTE
Patient reports feeling better after PO hydration. Provider ordered cervical check then discharge if feeling better.

## 2025-02-14 RX ORDER — METRONIDAZOLE 500 MG/1
500 TABLET ORAL 2 TIMES DAILY
Qty: 14 TABLET | Refills: 0 | Status: SHIPPED | OUTPATIENT
Start: 2025-02-14 | End: 2025-02-21

## 2025-02-15 LAB — BACTERIA UR CULT: NORMAL

## 2025-02-17 ENCOUNTER — TELEPHONE (OUTPATIENT)
Dept: OBGYN CLINIC | Age: 30
End: 2025-02-17

## 2025-02-18 ENCOUNTER — ROUTINE PRENATAL (OUTPATIENT)
Dept: OBGYN CLINIC | Age: 30
End: 2025-02-18
Payer: MEDICAID

## 2025-02-18 VITALS
DIASTOLIC BLOOD PRESSURE: 89 MMHG | BODY MASS INDEX: 30.82 KG/M2 | HEART RATE: 97 BPM | SYSTOLIC BLOOD PRESSURE: 124 MMHG | WEIGHT: 174 LBS

## 2025-02-18 DIAGNOSIS — Z3A.36 36 WEEKS GESTATION OF PREGNANCY: ICD-10-CM

## 2025-02-18 DIAGNOSIS — Z34.03 ENCOUNTER FOR SUPERVISION OF NORMAL FIRST PREGNANCY, THIRD TRIMESTER: Primary | ICD-10-CM

## 2025-02-18 PROCEDURE — 99213 OFFICE O/P EST LOW 20 MIN: CPT | Performed by: ADVANCED PRACTICE MIDWIFE

## 2025-02-18 NOTE — PROGRESS NOTES
CNM Prenatal Office Note  Subjective:  Dixie Mascorro is here for a return obstetrical visit. Today she is 36w1d weeks EGA.  Pt does feel fetal movement regularly. Denies headaches, RUQ pain, or visual changes. Denies vaginal bleeding or leaking of fluid.      Problems/Complaints today:  Pressure  contractions  Objective:  Mother's Prenatal Vitals  BP: 124/89  Weight - Scale: 78.9 kg (174 lb)  Pulse: 97  Patient Position: Sitting  Prenatal Fetal Information  Movement: Present  Pt is A&Ox3, in no acute distress. Normocephalic, atraumatic. PERRL. Resp even and non-labored. Skin pink, warm & dry. Gravid abdomen. GREGG's well. Gait steady.   Assessment:    IUP at 36w1d wks      Diagnosis Orders   1. Encounter for supervision of normal first pregnancy, third trimester        2. 36 weeks gestation of pregnancy          Plan:  Problems/Complaints Management Plan:  Labor signs discussed  Routine OB Management Plan:  Pt counseled on balanced nutrition, adequate fluid intake, taking PNV daily, and exercise along with labor precautions, GHTN precautions, and Kick count  Continue with routine prenatal care.   surveillance not indicated  RTC in 1 wks for prenatal visit    MEDICATIONS:  No orders of the defined types were placed in this encounter.    ORDERS:  No orders of the defined types were placed in this encounter.

## 2025-02-24 ENCOUNTER — HOSPITAL ENCOUNTER (INPATIENT)
Age: 30
LOS: 3 days | Discharge: HOME | End: 2025-02-27
Attending: NURSE PRACTITIONER | Admitting: NURSE PRACTITIONER
Payer: MEDICAID

## 2025-02-24 ENCOUNTER — TELEPHONE (OUTPATIENT)
Dept: OBGYN CLINIC | Age: 30
End: 2025-02-24

## 2025-02-24 ENCOUNTER — APPOINTMENT (OUTPATIENT)
Dept: ULTRASOUND IMAGING | Age: 30
End: 2025-02-24
Payer: MEDICAID

## 2025-02-24 PROBLEM — O26.893 VAGINAL DISCHARGE DURING PREGNANCY IN THIRD TRIMESTER: Status: ACTIVE | Noted: 2025-02-24

## 2025-02-24 PROBLEM — Z3A.37 37 WEEKS GESTATION OF PREGNANCY: Status: ACTIVE | Noted: 2025-02-24

## 2025-02-24 PROBLEM — N89.8 VAGINAL DISCHARGE DURING PREGNANCY IN THIRD TRIMESTER: Status: ACTIVE | Noted: 2025-02-24

## 2025-02-24 LAB
ABO/RH: NORMAL
ALBUMIN SERPL-MCNC: 3.5 G/DL (ref 3.5–5.2)
ALP SERPL-CCNC: 179 U/L (ref 35–104)
ALT SERPL-CCNC: 7 U/L (ref 5–33)
AMNISURE, POC: NEGATIVE
AMPHET UR QL SCN: NEGATIVE
ANION GAP SERPL CALCULATED.3IONS-SCNC: 14 MMOL/L (ref 8–16)
ANTIBODY SCREEN: NORMAL
AST SERPL-CCNC: 14 U/L (ref 5–32)
BACTERIA SPEC QL WET PREP: NORMAL
BACTERIA URNS QL MICRO: NORMAL /HPF
BARBITURATES UR QL SCN: NEGATIVE
BASOPHILS # BLD: 0 K/UL (ref 0–0.2)
BASOPHILS NFR BLD: 0.3 % (ref 0–1)
BENZODIAZ UR QL SCN: NEGATIVE
BILIRUB SERPL-MCNC: 0.2 MG/DL (ref 0.2–1.2)
BILIRUB UR QL STRIP: NEGATIVE
BUN SERPL-MCNC: 4 MG/DL (ref 6–20)
BUPRENORPHINE URINE: NEGATIVE
CALCIUM SERPL-MCNC: 8.5 MG/DL (ref 8.6–10)
CANNABINOIDS UR QL SCN: POSITIVE
CHLORIDE SERPL-SCNC: 104 MMOL/L (ref 98–107)
CLARITY UR: CLEAR
CLUE CELLS VAG QL WET PREP: NORMAL
CO2 SERPL-SCNC: 18 MMOL/L (ref 22–29)
COCAINE UR QL SCN: NEGATIVE
COLOR UR: YELLOW
CREAT SERPL-MCNC: 0.5 MG/DL (ref 0.5–0.9)
CRYSTALS URNS MICRO: NORMAL /HPF
DRUG SCREEN COMMENT UR-IMP: ABNORMAL
EOSINOPHIL # BLD: 0 K/UL (ref 0–0.6)
EOSINOPHIL NFR BLD: 0.3 % (ref 0–5)
EPI CELLS #/AREA URNS AUTO: 4 /HPF (ref 0–5)
EPI CELLS VAG QL WET PREP: NORMAL
ERYTHROCYTE [DISTWIDTH] IN BLOOD BY AUTOMATED COUNT: 13 % (ref 11.5–14.5)
FENTANYL SCREEN, URINE: NEGATIVE
GLUCOSE SERPL-MCNC: 89 MG/DL (ref 70–99)
GLUCOSE UR STRIP.AUTO-MCNC: NEGATIVE MG/DL
HCT VFR BLD AUTO: 36.7 % (ref 37–47)
HGB BLD-MCNC: 11.7 G/DL (ref 12–16)
HGB UR STRIP.AUTO-MCNC: NEGATIVE MG/L
HYALINE CASTS #/AREA URNS AUTO: 2 /HPF (ref 0–8)
IMM GRANULOCYTES # BLD: 0.1 K/UL
KETONES UR STRIP.AUTO-MCNC: NEGATIVE MG/DL
LEUKOCYTE ESTERASE UR QL STRIP.AUTO: ABNORMAL
LYMPHOCYTES # BLD: 2 K/UL (ref 1.1–4.5)
LYMPHOCYTES NFR BLD: 17 % (ref 20–40)
Lab: NORMAL
MAGNESIUM SERPL-MCNC: 1.6 MG/DL (ref 1.6–2.6)
MCH RBC QN AUTO: 27.9 PG (ref 27–31)
MCHC RBC AUTO-ENTMCNC: 31.9 G/DL (ref 33–37)
MCV RBC AUTO: 87.6 FL (ref 81–99)
METHADONE UR QL SCN: NEGATIVE
METHAMPHETAMINE, URINE: NEGATIVE
MONOCYTES # BLD: 0.6 K/UL (ref 0–0.9)
MONOCYTES NFR BLD: 4.8 % (ref 0–10)
NEGATIVE QC PASS/FAIL: NORMAL
NEUTROPHILS # BLD: 8.9 K/UL (ref 1.5–7.5)
NEUTS SEG NFR BLD: 77.1 % (ref 50–65)
NITRITE UR QL STRIP.AUTO: NEGATIVE
OPIATES UR QL SCN: NEGATIVE
OXYCODONE UR QL SCN: NEGATIVE
PCP UR QL SCN: NEGATIVE
PH UR STRIP.AUTO: 7 [PH] (ref 5–8)
PLATELET # BLD AUTO: 215 K/UL (ref 130–400)
PMV BLD AUTO: 10.7 FL (ref 9.4–12.3)
POSITIVE QC PASS/FAIL: NORMAL
POTASSIUM SERPL-SCNC: 3.5 MMOL/L (ref 3.5–5)
PROT SERPL-MCNC: 6.7 G/DL (ref 6.4–8.3)
PROT UR STRIP.AUTO-MCNC: NEGATIVE MG/DL
RBC # BLD AUTO: 4.19 M/UL (ref 4.2–5.4)
RBC #/AREA URNS AUTO: 1 /HPF (ref 0–4)
RBC VAG QL: NORMAL
SODIUM SERPL-SCNC: 136 MMOL/L (ref 136–145)
SP GR UR STRIP.AUTO: 1.01 (ref 1–1.03)
SPECIMEN SOURCE FLD: NORMAL
T VAGINALIS VAG QL WET PREP: NORMAL
TRICYCLIC ANTIDEPRESSANTS, UR: NEGATIVE
UROBILINOGEN UR STRIP.AUTO-MCNC: 0.2 E.U./DL
WBC # BLD AUTO: 11.6 K/UL (ref 4.8–10.8)
WBC #/AREA URNS AUTO: 4 /HPF (ref 0–5)
WBC VAG QL WET PREP: NORMAL
YEAST VAG QL WET PREP: NORMAL

## 2025-02-24 PROCEDURE — 99222 1ST HOSP IP/OBS MODERATE 55: CPT | Performed by: NURSE PRACTITIONER

## 2025-02-24 PROCEDURE — 80053 COMPREHEN METABOLIC PANEL: CPT

## 2025-02-24 PROCEDURE — 80307 DRUG TEST PRSMV CHEM ANLYZR: CPT

## 2025-02-24 PROCEDURE — 86592 SYPHILIS TEST NON-TREP QUAL: CPT

## 2025-02-24 PROCEDURE — 6360000002 HC RX W HCPCS: Performed by: NURSE PRACTITIONER

## 2025-02-24 PROCEDURE — 99214 OFFICE O/P EST MOD 30 MIN: CPT

## 2025-02-24 PROCEDURE — 83735 ASSAY OF MAGNESIUM: CPT

## 2025-02-24 PROCEDURE — 86850 RBC ANTIBODY SCREEN: CPT

## 2025-02-24 PROCEDURE — 84112 EVAL AMNIOTIC FLUID PROTEIN: CPT

## 2025-02-24 PROCEDURE — 81001 URINALYSIS AUTO W/SCOPE: CPT

## 2025-02-24 PROCEDURE — 86901 BLOOD TYPING SEROLOGIC RH(D): CPT

## 2025-02-24 PROCEDURE — 1220000000 HC SEMI PRIVATE OB R&B

## 2025-02-24 PROCEDURE — 86900 BLOOD TYPING SEROLOGIC ABO: CPT

## 2025-02-24 PROCEDURE — G0480 DRUG TEST DEF 1-7 CLASSES: HCPCS

## 2025-02-24 PROCEDURE — 85025 COMPLETE CBC W/AUTO DIFF WBC: CPT

## 2025-02-24 PROCEDURE — 6370000000 HC RX 637 (ALT 250 FOR IP): Performed by: NURSE PRACTITIONER

## 2025-02-24 PROCEDURE — 36415 COLL VENOUS BLD VENIPUNCTURE: CPT

## 2025-02-24 PROCEDURE — 76815 OB US LIMITED FETUS(S): CPT

## 2025-02-24 RX ORDER — SODIUM CHLORIDE 0.9 % (FLUSH) 0.9 %
5-40 SYRINGE (ML) INJECTION EVERY 12 HOURS SCHEDULED
Status: DISCONTINUED | OUTPATIENT
Start: 2025-02-24 | End: 2025-02-25 | Stop reason: SDUPTHER

## 2025-02-24 RX ORDER — PROMETHAZINE HYDROCHLORIDE 25 MG/ML
12.5 INJECTION, SOLUTION INTRAMUSCULAR; INTRAVENOUS ONCE
Status: COMPLETED | OUTPATIENT
Start: 2025-02-24 | End: 2025-02-24

## 2025-02-24 RX ORDER — MEPERIDINE HYDROCHLORIDE 25 MG/ML
25 INJECTION INTRAMUSCULAR; INTRAVENOUS; SUBCUTANEOUS ONCE
Status: DISCONTINUED | OUTPATIENT
Start: 2025-02-24 | End: 2025-02-24

## 2025-02-24 RX ORDER — SODIUM CHLORIDE 0.9 % (FLUSH) 0.9 %
5-40 SYRINGE (ML) INJECTION PRN
Status: DISCONTINUED | OUTPATIENT
Start: 2025-02-24 | End: 2025-02-25 | Stop reason: SDUPTHER

## 2025-02-24 RX ORDER — ZOLPIDEM TARTRATE 5 MG/1
5 TABLET ORAL NIGHTLY PRN
Status: DISCONTINUED | OUTPATIENT
Start: 2025-02-24 | End: 2025-02-27 | Stop reason: HOSPADM

## 2025-02-24 RX ORDER — TERBUTALINE SULFATE 1 MG/ML
0.25 INJECTION SUBCUTANEOUS
Status: ACTIVE | OUTPATIENT
Start: 2025-02-24 | End: 2025-02-25

## 2025-02-24 RX ORDER — SODIUM CHLORIDE 9 MG/ML
25 INJECTION, SOLUTION INTRAVENOUS PRN
Status: DISCONTINUED | OUTPATIENT
Start: 2025-02-24 | End: 2025-02-26 | Stop reason: HOSPADM

## 2025-02-24 RX ORDER — ACETAMINOPHEN 325 MG/1
650 TABLET ORAL EVERY 4 HOURS PRN
Status: DISCONTINUED | OUTPATIENT
Start: 2025-02-24 | End: 2025-02-26 | Stop reason: HOSPADM

## 2025-02-24 RX ORDER — MEPERIDINE HYDROCHLORIDE 25 MG/ML
25 INJECTION INTRAMUSCULAR; INTRAVENOUS; SUBCUTANEOUS ONCE
Status: COMPLETED | OUTPATIENT
Start: 2025-02-24 | End: 2025-02-24

## 2025-02-24 RX ORDER — FENTANYL CITRATE 50 UG/ML
25 INJECTION, SOLUTION INTRAMUSCULAR; INTRAVENOUS
Status: DISCONTINUED | OUTPATIENT
Start: 2025-02-24 | End: 2025-02-27 | Stop reason: HOSPADM

## 2025-02-24 RX ADMIN — MEPERIDINE HYDROCHLORIDE 25 MG: 25 INJECTION INTRAMUSCULAR; INTRAVENOUS; SUBCUTANEOUS at 13:56

## 2025-02-24 RX ADMIN — FENTANYL CITRATE 25 MCG: 50 INJECTION, SOLUTION INTRAMUSCULAR; INTRAVENOUS at 20:13

## 2025-02-24 RX ADMIN — ZOLPIDEM TARTRATE 5 MG: 5 TABLET ORAL at 23:29

## 2025-02-24 RX ADMIN — PROMETHAZINE HYDROCHLORIDE 12.5 MG: 25 INJECTION INTRAMUSCULAR; INTRAVENOUS at 13:57

## 2025-02-24 ASSESSMENT — PAIN DESCRIPTION - DESCRIPTORS: DESCRIPTORS: CRAMPING

## 2025-02-24 ASSESSMENT — PAIN SCALES - GENERAL: PAINLEVEL_OUTOF10: 7

## 2025-02-24 ASSESSMENT — PAIN DESCRIPTION - ORIENTATION: ORIENTATION: LOWER

## 2025-02-24 ASSESSMENT — PAIN DESCRIPTION - LOCATION
LOCATION: ABDOMEN
LOCATION: ABDOMEN

## 2025-02-24 NOTE — FLOWSHEET NOTE
Pt ambulated to labor and delivery, reports contractions for the last 2 days with vaginal discharge and abd pain. Denies vaginal bleeding, + fetal movement, TOCO and EFM placed to soft non-tender abd.

## 2025-02-24 NOTE — DISCHARGE INSTRUCTIONS
www.operationhope.org  Mey Tinsley, [phone]      [place] Social Service Department  Website: www.[place].gov    General Information Phone [phone]  [place] Office: [phone]  [place] Office: [phone]    [facility]  Senior and Disability Apartments  [address], [place], [place] [address] [phone]    Nadir Silvestre at [address] [id number] [phone]  Food Resources*  (Call 211/[facility] for additional resources)    Regional   [place] [employer] ([employer]   Manuelito Tomas, Eleanor Felix Graves, [place], [place] and [place]  . [phone]  Contact [facility] to find out more information on the [place] food and commodities, get set up for Meals on Wheels, or get in contact with a local Senior Center.    [facility]  Typically serve a daily lunch during the week- contact for meal times  [facility] [place] [address] [place] [address]  [phone]  Serving a free lunch from 11:00 AM to 1:00 PM Monday through Friday. Facility includes Showers, Laundry Facility, and .  [facility]  [address] [place], [place] [address]  [phone]  [facility]  [address] [place], [place] [address]  [phone]  Serving a free lunch on Sundays  [facility]  [address] [address]  [phone]  [facility]  [address] [place] [address]  [phone]  Serving a free lunch on Saturdays from 11am-1pm    Senior Center   Typically serve a daily meal and serve as point of contact for Meals on Wheels program  [facility]  [address] [place], [place]  [phone]    Food Pantry  Food distribution. Most require person to bring ID/documentation. Contact for information.    [employer]   [address] [facility] [place]  2 weeks, or if you feel like life is not worth living, you may have postpartum depression. This is different for each person. Some mothers with serious depression may worry intensely about their infant's well-being. Others may feel distant from their child. Some mothers may even feel that they might harm their baby. Some may have signs ofparanoia, wondering if someone is watching them.  Depression is not a sign of weakness. It's a medical condition that requires treatment. Medicine and counseling often work well to reduce depression. Talkto your doctor about taking antidepressant medicine while breastfeeding.  Follow-up care is a key part of your treatment and safety. Be sure to make and go to all appointments, and call your doctor if you are having problems. It's also a good idea to know your test results and keep alist of the medicines you take.  How do you know if you are depressed?  With all the changes in your life, you may not know if you are depressed. Pregnancy sometimes causes changes in how you feel that are similar to thesymptoms of depression.  Symptoms of depression include:  Feeling sad or hopeless and losing interest in daily activities. These are the most common symptoms of depression.  Sleeping too much or not enough.  Feeling tired. You may feel as if you have no energy.  Eating too much or too little.  Writing or talking about death, such as writing suicide notes or talking about guns, knives, or pills.  If you or someone you know talks about suicide, self-harm, or feeling hopeless, get help right away. Call the National Suicide Prevention Lifeline at 1-800-273-talk (1-721.361.4731) or text HOME to 613787 to access the CrisisText Line. Consider saving these numbers in your phone.    How can you care for yourself at home?  Be safe with medicines. Take your medicines exactly as prescribed. Call your doctor if you think you are having a problem with your medicine.  Eat a healthy diet so that you can

## 2025-02-24 NOTE — CARE COORDINATION
Lisa provided written documentation for community resources based on Pt SDOH scores and any additional community resources needed. These resources will be provided to the Pt at D/C. Electronically signed by Lisa Crooks on 2/24/2025 at 1:24 PM

## 2025-02-25 ENCOUNTER — ANESTHESIA (OUTPATIENT)
Dept: LABOR AND DELIVERY | Age: 30
End: 2025-02-25
Payer: MEDICAID

## 2025-02-25 ENCOUNTER — ANESTHESIA EVENT (OUTPATIENT)
Dept: LABOR AND DELIVERY | Age: 30
End: 2025-02-25
Payer: MEDICAID

## 2025-02-25 PROCEDURE — 2580000003 HC RX 258: Performed by: NURSE PRACTITIONER

## 2025-02-25 PROCEDURE — 6370000000 HC RX 637 (ALT 250 FOR IP): Performed by: NURSE PRACTITIONER

## 2025-02-25 PROCEDURE — 6360000002 HC RX W HCPCS: Performed by: NURSE PRACTITIONER

## 2025-02-25 PROCEDURE — 59409 OBSTETRICAL CARE: CPT | Performed by: NURSE PRACTITIONER

## 2025-02-25 PROCEDURE — 6360000002 HC RX W HCPCS: Performed by: NURSE ANESTHETIST, CERTIFIED REGISTERED

## 2025-02-25 PROCEDURE — 0HQ9XZZ REPAIR PERINEUM SKIN, EXTERNAL APPROACH: ICD-10-PCS | Performed by: NURSE PRACTITIONER

## 2025-02-25 PROCEDURE — 3700000025 EPIDURAL BLOCK: Performed by: NURSE ANESTHETIST, CERTIFIED REGISTERED

## 2025-02-25 PROCEDURE — 1220000000 HC SEMI PRIVATE OB R&B

## 2025-02-25 PROCEDURE — 10907ZC DRAINAGE OF AMNIOTIC FLUID, THERAPEUTIC FROM PRODUCTS OF CONCEPTION, VIA NATURAL OR ARTIFICIAL OPENING: ICD-10-PCS | Performed by: NURSE PRACTITIONER

## 2025-02-25 PROCEDURE — 2500000003 HC RX 250 WO HCPCS: Performed by: NURSE PRACTITIONER

## 2025-02-25 PROCEDURE — 7200000001 HC VAGINAL DELIVERY

## 2025-02-25 RX ORDER — SODIUM CHLORIDE 0.9 % (FLUSH) 0.9 %
5-40 SYRINGE (ML) INJECTION EVERY 12 HOURS SCHEDULED
Status: DISCONTINUED | OUTPATIENT
Start: 2025-02-25 | End: 2025-02-27 | Stop reason: HOSPADM

## 2025-02-25 RX ORDER — ROPIVACAINE HYDROCHLORIDE 2 MG/ML
INJECTION, SOLUTION EPIDURAL; INFILTRATION; PERINEURAL
Status: DISCONTINUED | OUTPATIENT
Start: 2025-02-25 | End: 2025-02-25 | Stop reason: SDUPTHER

## 2025-02-25 RX ORDER — SODIUM CHLORIDE, SODIUM LACTATE, POTASSIUM CHLORIDE, AND CALCIUM CHLORIDE .6; .31; .03; .02 G/100ML; G/100ML; G/100ML; G/100ML
500 INJECTION, SOLUTION INTRAVENOUS PRN
Status: DISCONTINUED | OUTPATIENT
Start: 2025-02-25 | End: 2025-02-27 | Stop reason: HOSPADM

## 2025-02-25 RX ORDER — EPHEDRINE SULFATE/0.9% NACL/PF 25 MG/5 ML
5 SYRINGE (ML) INTRAVENOUS PRN
Status: DISCONTINUED | OUTPATIENT
Start: 2025-02-26 | End: 2025-02-26 | Stop reason: HOSPADM

## 2025-02-25 RX ORDER — SODIUM CHLORIDE, SODIUM LACTATE, POTASSIUM CHLORIDE, CALCIUM CHLORIDE 600; 310; 30; 20 MG/100ML; MG/100ML; MG/100ML; MG/100ML
INJECTION, SOLUTION INTRAVENOUS CONTINUOUS
Status: DISCONTINUED | OUTPATIENT
Start: 2025-02-25 | End: 2025-02-27 | Stop reason: HOSPADM

## 2025-02-25 RX ORDER — ROPIVACAINE HYDROCHLORIDE 2 MG/ML
12 INJECTION, SOLUTION EPIDURAL; INFILTRATION; PERINEURAL CONTINUOUS
Status: DISCONTINUED | OUTPATIENT
Start: 2025-02-25 | End: 2025-02-26 | Stop reason: HOSPADM

## 2025-02-25 RX ORDER — ONDANSETRON 2 MG/ML
4 INJECTION INTRAMUSCULAR; INTRAVENOUS EVERY 6 HOURS PRN
Status: DISCONTINUED | OUTPATIENT
Start: 2025-02-25 | End: 2025-02-25 | Stop reason: SDUPTHER

## 2025-02-25 RX ORDER — DOCUSATE SODIUM 100 MG/1
100 CAPSULE, LIQUID FILLED ORAL 2 TIMES DAILY
Status: DISCONTINUED | OUTPATIENT
Start: 2025-02-25 | End: 2025-02-27 | Stop reason: HOSPADM

## 2025-02-25 RX ORDER — IBUPROFEN 400 MG/1
800 TABLET, FILM COATED ORAL EVERY 8 HOURS
Status: DISCONTINUED | OUTPATIENT
Start: 2025-02-25 | End: 2025-02-27 | Stop reason: HOSPADM

## 2025-02-25 RX ORDER — ACETAMINOPHEN 500 MG
1000 TABLET ORAL EVERY 8 HOURS
Status: DISCONTINUED | OUTPATIENT
Start: 2025-02-26 | End: 2025-02-27 | Stop reason: HOSPADM

## 2025-02-25 RX ORDER — SODIUM CHLORIDE 9 MG/ML
INJECTION, SOLUTION INTRAVENOUS PRN
Status: DISCONTINUED | OUTPATIENT
Start: 2025-02-25 | End: 2025-02-27 | Stop reason: HOSPADM

## 2025-02-25 RX ORDER — ONDANSETRON 2 MG/ML
4 INJECTION INTRAMUSCULAR; INTRAVENOUS EVERY 6 HOURS PRN
Status: DISCONTINUED | OUTPATIENT
Start: 2025-02-25 | End: 2025-02-27 | Stop reason: HOSPADM

## 2025-02-25 RX ORDER — CARBOPROST TROMETHAMINE 250 UG/ML
250 INJECTION, SOLUTION INTRAMUSCULAR PRN
Status: DISCONTINUED | OUTPATIENT
Start: 2025-02-25 | End: 2025-02-27 | Stop reason: HOSPADM

## 2025-02-25 RX ORDER — TRANEXAMIC ACID 10 MG/ML
1000 INJECTION, SOLUTION INTRAVENOUS
Status: ACTIVE | OUTPATIENT
Start: 2025-02-25 | End: 2025-02-26

## 2025-02-25 RX ORDER — EPHEDRINE SULFATE/0.9% NACL/PF 25 MG/5 ML
10 SYRINGE (ML) INTRAVENOUS
Status: DISCONTINUED | OUTPATIENT
Start: 2025-02-25 | End: 2025-02-26 | Stop reason: HOSPADM

## 2025-02-25 RX ORDER — ONDANSETRON 4 MG/1
4 TABLET, ORALLY DISINTEGRATING ORAL EVERY 6 HOURS PRN
Status: DISCONTINUED | OUTPATIENT
Start: 2025-02-25 | End: 2025-02-25 | Stop reason: SDUPTHER

## 2025-02-25 RX ORDER — SODIUM CHLORIDE 0.9 % (FLUSH) 0.9 %
5-40 SYRINGE (ML) INJECTION PRN
Status: DISCONTINUED | OUTPATIENT
Start: 2025-02-25 | End: 2025-02-27 | Stop reason: HOSPADM

## 2025-02-25 RX ORDER — METHYLERGONOVINE MALEATE 0.2 MG/ML
200 INJECTION INTRAVENOUS PRN
Status: DISCONTINUED | OUTPATIENT
Start: 2025-02-25 | End: 2025-02-27 | Stop reason: HOSPADM

## 2025-02-25 RX ORDER — MISOPROSTOL 200 UG/1
600 TABLET ORAL ONCE
Status: COMPLETED | OUTPATIENT
Start: 2025-02-25 | End: 2025-02-25

## 2025-02-25 RX ORDER — MISOPROSTOL 200 UG/1
400 TABLET ORAL PRN
Status: DISCONTINUED | OUTPATIENT
Start: 2025-02-25 | End: 2025-02-27 | Stop reason: HOSPADM

## 2025-02-25 RX ORDER — NALOXONE HYDROCHLORIDE 0.4 MG/ML
INJECTION, SOLUTION INTRAMUSCULAR; INTRAVENOUS; SUBCUTANEOUS PRN
Status: DISCONTINUED | OUTPATIENT
Start: 2025-02-25 | End: 2025-02-26 | Stop reason: HOSPADM

## 2025-02-25 RX ORDER — ONDANSETRON 4 MG/1
4 TABLET, ORALLY DISINTEGRATING ORAL EVERY 6 HOURS PRN
Status: DISCONTINUED | OUTPATIENT
Start: 2025-02-25 | End: 2025-02-27 | Stop reason: HOSPADM

## 2025-02-25 RX ADMIN — ONDANSETRON 4 MG: 2 INJECTION, SOLUTION INTRAMUSCULAR; INTRAVENOUS at 18:05

## 2025-02-25 RX ADMIN — SODIUM CHLORIDE, PRESERVATIVE FREE 10 ML: 5 INJECTION INTRAVENOUS at 11:22

## 2025-02-25 RX ADMIN — Medication 2 MILLI-UNITS/MIN: at 11:23

## 2025-02-25 RX ADMIN — MISOPROSTOL 600 MCG: 200 TABLET ORAL at 23:09

## 2025-02-25 RX ADMIN — ROPIVACAINE HYDROCHLORIDE 12 ML/HR: 2 INJECTION, SOLUTION EPIDURAL; INFILTRATION at 16:05

## 2025-02-25 RX ADMIN — DOCUSATE SODIUM 100 MG: 100 CAPSULE, LIQUID FILLED ORAL at 23:21

## 2025-02-25 RX ADMIN — SODIUM CHLORIDE, POTASSIUM CHLORIDE, SODIUM LACTATE AND CALCIUM CHLORIDE: 600; 310; 30; 20 INJECTION, SOLUTION INTRAVENOUS at 15:46

## 2025-02-25 RX ADMIN — IBUPROFEN 800 MG: 400 TABLET, FILM COATED ORAL at 23:21

## 2025-02-25 SDOH — ECONOMIC STABILITY: INCOME INSECURITY: HOW HARD IS IT FOR YOU TO PAY FOR THE VERY BASICS LIKE FOOD, HOUSING, MEDICAL CARE, AND HEATING?: NOT VERY HARD

## 2025-02-25 SDOH — ECONOMIC STABILITY: INCOME INSECURITY: IN THE PAST 12 MONTHS, HAS THE ELECTRIC, GAS, OIL, OR WATER COMPANY THREATENED TO SHUT OFF SERVICE IN YOUR HOME?: NO

## 2025-02-25 SDOH — ECONOMIC STABILITY: FOOD INSECURITY: WITHIN THE PAST 12 MONTHS, YOU WORRIED THAT YOUR FOOD WOULD RUN OUT BEFORE YOU GOT MONEY TO BUY MORE.: NEVER TRUE

## 2025-02-25 ASSESSMENT — PAIN SCALES - GENERAL: PAINLEVEL_OUTOF10: 5

## 2025-02-25 ASSESSMENT — PATIENT HEALTH QUESTIONNAIRE - PHQ9
6. FEELING BAD ABOUT YOURSELF - OR THAT YOU ARE A FAILURE OR HAVE LET YOURSELF OR YOUR FAMILY DOWN: NOT AT ALL
7. TROUBLE CONCENTRATING ON THINGS, SUCH AS READING THE NEWSPAPER OR WATCHING TELEVISION: NOT AT ALL
8. MOVING OR SPEAKING SO SLOWLY THAT OTHER PEOPLE COULD HAVE NOTICED. OR THE OPPOSITE, BEING SO FIGETY OR RESTLESS THAT YOU HAVE BEEN MOVING AROUND A LOT MORE THAN USUAL: NOT AT ALL
SUM OF ALL RESPONSES TO PHQ QUESTIONS 1-9: 0
SUM OF ALL RESPONSES TO PHQ9 QUESTIONS 1 & 2: 0
3. TROUBLE FALLING OR STAYING ASLEEP: NOT AT ALL
5. POOR APPETITE OR OVEREATING: NOT AT ALL
2. FEELING DOWN, DEPRESSED OR HOPELESS: NOT AT ALL
SUM OF ALL RESPONSES TO PHQ QUESTIONS 1-9: 0
SUM OF ALL RESPONSES TO PHQ QUESTIONS 1-9: 0
4. FEELING TIRED OR HAVING LITTLE ENERGY: NOT AT ALL
10. IF YOU CHECKED OFF ANY PROBLEMS, HOW DIFFICULT HAVE THESE PROBLEMS MADE IT FOR YOU TO DO YOUR WORK, TAKE CARE OF THINGS AT HOME, OR GET ALONG WITH OTHER PEOPLE: NOT DIFFICULT AT ALL
9. THOUGHTS THAT YOU WOULD BE BETTER OFF DEAD, OR OF HURTING YOURSELF: NOT AT ALL
SUM OF ALL RESPONSES TO PHQ QUESTIONS 1-9: 0
1. LITTLE INTEREST OR PLEASURE IN DOING THINGS: NOT AT ALL

## 2025-02-25 ASSESSMENT — PAIN DESCRIPTION - LOCATION: LOCATION: ABDOMEN

## 2025-02-25 ASSESSMENT — PAIN DESCRIPTION - DESCRIPTORS: DESCRIPTORS: CRAMPING

## 2025-02-25 NOTE — ANESTHESIA PROCEDURE NOTES
Epidural Block    Patient location during procedure: patient floor  Start time: 2/25/2025 3:58 PM  End time: 2/25/2025 4:00 PM  Reason for block: labor epidural  Staffing  Performed: other anesthesia staff   Performed by: Peace Gonzalez APRN - CRNA  Authorized by: Peace Gonzalez APRN - CRNA    Epidural  Patient position: sitting  Prep: Betadine  Patient monitoring: continuous pulse ox and frequent blood pressure checks  Approach: midline  Location: L3-4  Injection technique: GAYLA air  Guidance: paresthesia technique  Provider prep: mask and sterile gloves  Needle  Needle type: Tuohy   Needle gauge: 17 G  Needle length: 3.5 in  Needle insertion depth: 5 cm  Catheter type: multi-orifice  Catheter size: 19 G  Catheter at skin depth: 15 cm  Test dose: negative  Kit: vicente  Lot number: [id number]  Expiration date: 1/31/2026Catheter Secured: tegaderm and tape  Assessment  Hemodynamics: stable  Attempts: 1  Outcomes: uncomplicated and patient tolerated procedure well  Preanesthetic Checklist  Completed: patient identified, site marked, risks and benefits discussed, surgical/procedural consents, equipment checked, pre-op evaluation, timeout performed, anesthesia consent given, oxygen available and monitors applied/VS acknowledged

## 2025-02-25 NOTE — FLOWSHEET NOTE
This RN spoke to MARCK Ibrahim CNM at this time regarding a status update on pt. Per MARCK Ibrahim CNM orders were given to start Pitocin at 2 mu.

## 2025-02-25 NOTE — ANESTHESIA PRE PROCEDURE
97% 99%   Weight:       Height:                                                  BP Readings from Last 3 Encounters:   02/25/25 139/82   02/18/25 124/89   02/13/25 114/75       NPO Status:                                                                                 BMI:   Wt Readings from Last 3 Encounters:   02/24/25 78.9 kg (174 lb)   02/18/25 78.9 kg (174 lb)   02/12/25 78 kg (172 lb)     Body mass index is 30.82 kg/m².    CBC:   Lab Results   Component Value Date/Time    WBC 11.6 02/24/2025 12:32 PM    RBC 4.19 02/24/2025 12:32 PM    HGB 11.7 02/24/2025 12:32 PM    HCT 36.7 02/24/2025 12:32 PM    MCV 87.6 02/24/2025 12:32 PM    RDW 13.0 02/24/2025 12:32 PM     02/24/2025 12:32 PM       CMP:   Lab Results   Component Value Date/Time     02/24/2025 12:32 PM    K 3.5 02/24/2025 12:32 PM     02/24/2025 12:32 PM    CO2 18 02/24/2025 12:32 PM    BUN 4 02/24/2025 12:32 PM    CREATININE 0.5 02/24/2025 12:32 PM    LABGLOM >90 02/24/2025 12:32 PM    GLUCOSE 89 02/24/2025 12:32 PM    CALCIUM 8.5 02/24/2025 12:32 PM    BILITOT 0.2 02/24/2025 12:32 PM    ALKPHOS 179 02/24/2025 12:32 PM    AST 14 02/24/2025 12:32 PM    ALT 7 02/24/2025 12:32 PM       POC Tests: No results for input(s): \"POCGLU\", \"POCNA\", \"POCK\", \"POCCL\", \"POCBUN\", \"POCHEMO\", \"POCHCT\" in the last 72 hours.    Coags: No results found for: \"PROTIME\", \"INR\", \"APTT\"    HCG (If Applicable): No results found for: \"PREGTESTUR\", \"PREGSERUM\", \"HCG\", \"HCGQUANT\"     ABGs: No results found for: \"PHART\", \"PO2ART\", \"FBG6KAT\", \"FQB7JGB\", \"BEART\", \"O7RQVLCG\"     Type & Screen (If Applicable):  Lab Results   Component Value Date    ABORH O POS 02/24/2025    LABANTI NEG 02/24/2025       Drug/Infectious Status (If Applicable):  No results found for: \"HIV\", \"HEPCAB\"    COVID-19 Screening (If Applicable):   Lab Results   Component Value Date/Time    COVID19 Not Detected 07/02/2020 03:10 PM           Anesthesia Evaluation    Airway: Mallampati: II

## 2025-02-26 LAB
BASOPHILS # BLD: 0 K/UL (ref 0–0.2)
BASOPHILS NFR BLD: 0.2 % (ref 0–1)
EOSINOPHIL # BLD: 0 K/UL (ref 0–0.6)
EOSINOPHIL NFR BLD: 0.1 % (ref 0–5)
ERYTHROCYTE [DISTWIDTH] IN BLOOD BY AUTOMATED COUNT: 12.8 % (ref 11.5–14.5)
HCT VFR BLD AUTO: 29.8 % (ref 37–47)
HGB BLD-MCNC: 9.6 G/DL (ref 12–16)
IMM GRANULOCYTES # BLD: 0.1 K/UL
LYMPHOCYTES # BLD: 1.6 K/UL (ref 1.1–4.5)
LYMPHOCYTES NFR BLD: 11 % (ref 20–40)
MCH RBC QN AUTO: 27.7 PG (ref 27–31)
MCHC RBC AUTO-ENTMCNC: 32.2 G/DL (ref 33–37)
MCV RBC AUTO: 86.1 FL (ref 81–99)
MONOCYTES # BLD: 0.7 K/UL (ref 0–0.9)
MONOCYTES NFR BLD: 5 % (ref 0–10)
NEUTROPHILS # BLD: 11.8 K/UL (ref 1.5–7.5)
NEUTS SEG NFR BLD: 83.1 % (ref 50–65)
PLATELET # BLD AUTO: 176 K/UL (ref 130–400)
PMV BLD AUTO: 11.3 FL (ref 9.4–12.3)
RBC # BLD AUTO: 3.46 M/UL (ref 4.2–5.4)
WBC # BLD AUTO: 14.2 K/UL (ref 4.8–10.8)

## 2025-02-26 PROCEDURE — 6370000000 HC RX 637 (ALT 250 FOR IP): Performed by: NURSE PRACTITIONER

## 2025-02-26 PROCEDURE — 1220000000 HC SEMI PRIVATE OB R&B

## 2025-02-26 PROCEDURE — 36415 COLL VENOUS BLD VENIPUNCTURE: CPT

## 2025-02-26 PROCEDURE — 85025 COMPLETE CBC W/AUTO DIFF WBC: CPT

## 2025-02-26 RX ORDER — DIPHENHYDRAMINE HCL 25 MG
25 TABLET ORAL NIGHTLY PRN
Status: DISCONTINUED | OUTPATIENT
Start: 2025-02-26 | End: 2025-02-27 | Stop reason: HOSPADM

## 2025-02-26 RX ADMIN — SERTRALINE HYDROCHLORIDE 50 MG: 50 TABLET ORAL at 19:44

## 2025-02-26 RX ADMIN — IBUPROFEN 800 MG: 400 TABLET, FILM COATED ORAL at 08:10

## 2025-02-26 RX ADMIN — DIPHENHYDRAMINE HYDROCHLORIDE 25 MG: 25 TABLET ORAL at 21:52

## 2025-02-26 RX ADMIN — DOCUSATE SODIUM 100 MG: 100 CAPSULE, LIQUID FILLED ORAL at 08:10

## 2025-02-26 RX ADMIN — DOCUSATE SODIUM 100 MG: 100 CAPSULE, LIQUID FILLED ORAL at 19:44

## 2025-02-26 RX ADMIN — ACETAMINOPHEN 1000 MG: 500 TABLET ORAL at 10:23

## 2025-02-26 RX ADMIN — ACETAMINOPHEN 1000 MG: 500 TABLET ORAL at 03:00

## 2025-02-26 RX ADMIN — IBUPROFEN 800 MG: 400 TABLET, FILM COATED ORAL at 15:08

## 2025-02-26 RX ADMIN — ACETAMINOPHEN 1000 MG: 500 TABLET ORAL at 18:22

## 2025-02-26 ASSESSMENT — PAIN - FUNCTIONAL ASSESSMENT: PAIN_FUNCTIONAL_ASSESSMENT: ACTIVITIES ARE NOT PREVENTED

## 2025-02-26 ASSESSMENT — PAIN DESCRIPTION - LOCATION
LOCATION: BUTTOCKS
LOCATION: ABDOMEN;BUTTOCKS
LOCATION: BUTTOCKS

## 2025-02-26 ASSESSMENT — PAIN DESCRIPTION - DESCRIPTORS
DESCRIPTORS: SORE;SHARP
DESCRIPTORS: SHARP
DESCRIPTORS: CRAMPING;ACHING
DESCRIPTORS: SHARP
DESCRIPTORS: SORE;SHARP

## 2025-02-26 ASSESSMENT — PAIN SCALES - GENERAL
PAINLEVEL_OUTOF10: 5
PAINLEVEL_OUTOF10: 4
PAINLEVEL_OUTOF10: 2
PAINLEVEL_OUTOF10: 3
PAINLEVEL_OUTOF10: 3

## 2025-02-26 NOTE — LACTATION NOTE
Infant Name: Boy  Gestation: 37.1  Day of Life: NB  Birth weight: 6-14.1 lb (3120g)  Today's weight:  Weight loss:  24 hour summary of feeds: Breast x2, attempt x2, formula x5  Voids: 3  Stools: 4  Emesis: 0  Assistive device: none  Maternal History: , HPV, anxiety, depression, eating disorder, choly  Breastfeeding History: na  Maternal Medications: Tylenol PM, PNV  Nipple Assessment: Intact  Maternal Goal: one feeding at a time  Breast pump for home:      Mom called out for help with feeding. When this writer arrived in the room baby was taking a bottle. Mom states she is afraid she doesn't have any milk and wanted to give him a bottle.        Instructed mother that if she chooses to breastfeed mom will need to feed every 2- 3 hours for 15-20 mins each side or on demand watching for hunger cues and using waking techniques when needed. 8-12 feedings in 24 hours being the goal.  Discussed the benefits of colostrum. Informed mother that baby can be very sleepy the first 24 hours and typically the 2nd night babies will be more awake and want to feed a lot and that this is normal and important in establishing milk supply.     Also encouraged mom that she chooses whatever way mom and dad want to feed the baby. Encouraged mom to call out for help with feedings or any other questions. Verbalized understanding.

## 2025-02-26 NOTE — PROGRESS NOTES
IZABELA and provider IMER Ibrahim at bedside. Provider IMER Ibrahim is now observing fetal monitor tracings at bedside with IZABELA Bernard. Catheter removed with no complications. Pt is being prepared for delivery at this time.

## 2025-02-26 NOTE — PLAN OF CARE
Problem: Pain  Goal: Verbalizes/displays adequate comfort level or baseline comfort level  Outcome: Progressing     Problem: Vaginal Birth or  Section  Goal: Fetal and maternal status remain reassuring during the birth process  Description:  Birth OB-Pregnancy care plan goal which identifies if the fetal and maternal status remain reassuring during the birth process  Outcome: Completed     Problem: Postpartum  Goal: Experiences normal postpartum course  Description:  Postpartum OB-Pregnancy care plan goal which identifies if the mother is experiencing a normal postpartum course  Outcome: Progressing  Goal: Appropriate maternal -  bonding  Description:  Postpartum OB-Pregnancy care plan goal which identifies if the mother and  are bonding appropriately  Outcome: Progressing  Goal: Establishment of infant feeding pattern  Description:  Postpartum OB-Pregnancy care plan goal which identifies if the mother is establishing a feeding pattern with their   Outcome: Progressing  Goal: Incisions, wounds, or drain sites healing without S/S of infection  Outcome: Progressing     Problem: Infection - Adult  Goal: Absence of infection at discharge  Outcome: Progressing  Flowsheets (Taken 2025)  Absence of infection at discharge:   Assess and monitor for signs and symptoms of infection   Monitor lab/diagnostic results  Goal: Absence of infection during hospitalization  Outcome: Progressing  Flowsheets (Taken 2025)  Absence of infection during hospitalization:   Assess and monitor for signs and symptoms of infection   Monitor lab/diagnostic results  Goal: Absence of fever/infection during anticipated neutropenic period  Outcome: Progressing  Flowsheets (Taken 2025)  Absence of fever/infection during anticipated neutropenic period: Monitor white blood cell count     Problem: Safety - Adult  Goal: Free from fall injury  Outcome: Progressing     Problem: Discharge

## 2025-02-26 NOTE — ANESTHESIA POSTPROCEDURE EVALUATION
Department of Anesthesiology  Postprocedure Note    Patient: Dixie Mascorro  MRN: [id number]  YOB: 1995  Date of evaluation: 2/26/2025    Procedure Summary       Date: 02/25/25 Room / Location:     Anesthesia Start: 1614 Anesthesia Stop: 2221    Procedure: Labor Analgesia Diagnosis:     Scheduled Providers:  Responsible Provider: Peace Gonzalez APRN - CRNA    Anesthesia Type: epidural ASA Status: 2            Anesthesia Type: No value filed.    Denise Phase I:      Denise Phase II:      Anesthesia Post Evaluation    Patient location during evaluation: bedside  Patient participation: complete - patient participated  Level of consciousness: awake and alert  Pain score: 0  Airway patency: patent  Nausea & Vomiting: no vomiting and no nausea  Cardiovascular status: hemodynamically stable  Respiratory status: acceptable  Hydration status: stable  Pain management: adequate    No notable events documented.

## 2025-02-26 NOTE — PROGRESS NOTES
RN called IMER Ibrahim in regards to patients bleeding and clots coming out when fundus is massaged. RN was instructed to give Cytotec, see orders. Nurse will continue to monitor and notify provider of worsening or unimproved condition.

## 2025-02-26 NOTE — PROGRESS NOTES
RN and provider IMER Ibrahim at bedside. Pt effectively pushing with delivery of viable infant at this time. 1st degree tear repaired. Pts fundus is firm and bleeding is controlled at this time.

## 2025-02-26 NOTE — PROGRESS NOTES
RN at bedside. Fundus is firm with bleeding controlled. Pt ambulated to bathroom. Pt voided and performed everton care. Pt the ambulated to postpartum room 212 rolling baby's crib with a strong and steady gait. Pt informed to called out if she has any concerns or questions. No questions or concerns at this time.

## 2025-02-27 VITALS
RESPIRATION RATE: 18 BRPM | HEART RATE: 95 BPM | SYSTOLIC BLOOD PRESSURE: 113 MMHG | DIASTOLIC BLOOD PRESSURE: 83 MMHG | BODY MASS INDEX: 30.83 KG/M2 | WEIGHT: 174 LBS | HEIGHT: 63 IN | TEMPERATURE: 97.5 F | OXYGEN SATURATION: 96 %

## 2025-02-27 LAB — RPR SER QL: NORMAL

## 2025-02-27 PROCEDURE — 99239 HOSP IP/OBS DSCHRG MGMT >30: CPT | Performed by: NURSE PRACTITIONER

## 2025-02-27 PROCEDURE — 6370000000 HC RX 637 (ALT 250 FOR IP): Performed by: NURSE PRACTITIONER

## 2025-02-27 RX ORDER — IBUPROFEN 800 MG/1
800 TABLET, FILM COATED ORAL EVERY 8 HOURS
Qty: 90 TABLET | Refills: 0 | Status: SHIPPED | OUTPATIENT
Start: 2025-02-27

## 2025-02-27 RX ADMIN — IBUPROFEN 800 MG: 400 TABLET, FILM COATED ORAL at 00:59

## 2025-02-27 RX ADMIN — IBUPROFEN 800 MG: 400 TABLET, FILM COATED ORAL at 11:22

## 2025-02-27 RX ADMIN — DOCUSATE SODIUM 100 MG: 100 CAPSULE, LIQUID FILLED ORAL at 09:10

## 2025-02-27 RX ADMIN — SERTRALINE HYDROCHLORIDE 50 MG: 50 TABLET ORAL at 09:10

## 2025-02-27 ASSESSMENT — PAIN DESCRIPTION - ORIENTATION: ORIENTATION: LOWER

## 2025-02-27 ASSESSMENT — PAIN DESCRIPTION - DESCRIPTORS: DESCRIPTORS: CRAMPING

## 2025-02-27 ASSESSMENT — PAIN SCALES - GENERAL
PAINLEVEL_OUTOF10: 3
PAINLEVEL_OUTOF10: 0

## 2025-02-27 ASSESSMENT — PAIN - FUNCTIONAL ASSESSMENT
PAIN_FUNCTIONAL_ASSESSMENT: ACTIVITIES ARE NOT PREVENTED
PAIN_FUNCTIONAL_ASSESSMENT: ACTIVITIES ARE NOT PREVENTED

## 2025-02-27 ASSESSMENT — PAIN DESCRIPTION - LOCATION: LOCATION: ABDOMEN
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
## 2025-02-27 NOTE — PROGRESS NOTES
Discharge instructions reviewed with mother. She voices understanding. Pt plans to leave and take car home and come back to get baby.

## 2025-02-28 ENCOUNTER — LACTATION ENCOUNTER (OUTPATIENT)
Dept: LABOR AND DELIVERY | Age: 30
End: 2025-02-28

## 2025-02-28 NOTE — LACTATION NOTE
This note was copied from a baby's chart.  This is to inform you that baby has been seen since discharge    Day of Life: 3    : 25    GA: 37.1    Mom's blood type: O+    Baby's blood type: A+ ADELE-    Birth weight: 6-14.1 lb (3120g)    Discharge weight: 6-7.9 lb (2945g)    Today's weight: 6-5.5 lb (2880g)     Weight loss: -7.69%    Bilizap: (draw serum if within 3 mg/dl of phototherapy on graph):     25                      Today  Total neobili: 9.7       Total neobili: 20.2    Infant feeding (type and how often in the last 24 hours): formula feeding every 2 hours 25-30 ml    Stools (in the last 24 hours): 1    Voids (in the last 24 hours): 6+    Color:  Jaundice  Gums: moist  Skin: warm/dry  Cord: dry  Circumcision: healing, gauze and vaseline  Fontanels: soft/flat  Activity: alert/active    Education to mother:    Mother is aware of the benefits of breastfeeding and chooses to formula feed at this time. Suppression information given. Mother knows when to call MD if needed. Formula feeding booklet given.   Make sure baby is formula feeding 45-60 ml every 2-3 hours.  Jaundice precautions discussed, mother knows to bring baby back for evaluation sooner if needed, if whites of baby's eyes become yellow, difficulty with waking baby for feedings and no stools. Instructed to place baby were baby can get indirect sunlight, to help eliminate jaundice. Reminded mother to increase feedings, the more baby eats the more baby poops. Mother verbalizes understanding.      1554 Informed Isha Luna NNP, NNP is calling Dr. Fishman for orders  1600 Isha LEON informed me after speaking with Dr. Fishman, that baby needs to be readmitted due to high jaudice levels.   1605 Went to waiting room  informed mother baby will be readmitted per order, to start on triple bank PT.     Instructions to mother: mother and baby in waiting room, waiting for orders from [facility]

## 2025-03-07 NOTE — DISCHARGE SUMMARY
Weight: 3.12 kg (6 lb 14.1 oz)      Postpartum Day 2: Vaginal      REVIEW OF SYSTEMS  Patient is a  The patient feels well. The patient denies emotional concerns. Pain is well controlled with current medications. The baby iswell. Baby is feeding via both breast and bottle - Similac with iron. Urinary output is adequate. The patient is ambulating well. The patient is tolerating a normal diet.       PHYSICAL EXAM     /83   Pulse 95   Temp 97.5 °F (36.4 °C) (Temporal)   Resp 18   Ht 1.6 m (5' 3\")   Wt 78.9 kg (174 lb)   LMP 06/10/2024   SpO2 96%   Breastfeeding Unknown   BMI 30.82 kg/m²   General:    alert, appears stated age, and cooperative   Breast:  Soft, non-tender   Bowel Sounds:  active   Lochia:  appropriate   Uterine Fundus:   firm   Episiotomy/lac:  healing well   DVT Evaluation:  No evidence of DVT seen on physical exam.     Lab Results   Component Value Date    WBC 14.2 (H) 2025    HGB 9.6 (L) 2025    HCT 29.8 (L) 2025    MCV 86.1 2025     2025         DISCHARGE DIAGNOSIS:      Discharge Information/Patient Instructions:     Medication List        START taking these medications      ibuprofen 800 MG tablet  Commonly known as: ADVIL;MOTRIN  Take 1 tablet by mouth every 8 (eight) hours     sertraline 50 MG tablet  Commonly known as: ZOLOFT  Take 1 tablet by mouth daily            STOP taking these medications      Benadryl Allergy 25 MG tablet  Generic drug: diphenhydrAMINE     diphenhydrAMINE-APAP (sleep)  MG tablet  Commonly known as: TYLENOL PM EXTRA STRENGTH     multivitamin gummies childrens Chew gummies     NIFEdipine 10 MG immediate release capsule  Commonly known as: Procardia     pantoprazole 20 MG tablet  Commonly known as: PROTONIX               Where to Get Your Medications        These medications were sent to [facility] - [address] -  [phone] - F [phone]  [address] [place] [id number]

## 2025-03-07 NOTE — CARE COORDINATION
Consult: RUT Gonzalez contacted [facility] hotline to report the following information: Pt (mother) UDS on admission was positive for THC lab confirmed. [place] UDS was negative, cord was sent. Cord results on  are positive for THC and for meperdine, Pt (mother) received meperdine during labor. This writer spoke to centralized Intake report ID # [id number]. Electronically signed by Lisa Crooks on 3/7/2025 at 3:31 PM

## 2025-03-07 NOTE — L&D DELIVERY NOTE
laceration  Number of Repair Packets: 1       Vaginal Counts    Initial Count Personnel:   Initial Count Verified By: GILDARDO  Intial Sponge Count: Correct Intial Needles Count: Correct Intial Instruments Count: Correct   Final Sponges Count: Correct Final Needles  Count: Correct Final Instruments Count: Correct   Final Count Personnel:   Final Count Verified By: GILDARDO  Accurate Final Count?: Yes       Blood Loss  Mother: Dixie Mascorro #[id number]     Start of Mother's Information      Delivery Blood Loss   Intrapartum & Postpartum: 25 1021 - 25 2221    Delivery Admission: 25 1126 - 25 1600         Intrapartum & Postpartum Delivery Admission    Quantitative Blood Loss (mL) Hospital Encounter 335 grams 335 grams    Total  335 mL 335 mL               End of Mother's Information  Mother: Dixie Mascorro #[id number]                Delivery Providers    Delivering clinician: Beatriz Ibrahim APRN - CNM     Provider Role    Arline Bernard RN Primary Nurse    Brigid Hi RN Primary  Nurse    Peace Gonzalez APRN - CRNA Nurse Anesthetist    Beatriz Ibrahim APRN - IMER Midwife    RezaRosy RCP Respiratory Therapist    , Carol Scrub Tech    Korin Porter RN Registered Nurse               Assessment    Living Status: Living        Skin Color:   Heart Rate:   Reflex Irritability:   Muscle Tone:   Respiratory Effort:   Total:            1 Minute:    1    2    2    2    2    9         5 Minute:    1    2    2    2    2    9                                        Apgars Assigned By: GARY HI RN              Resuscitation    Method: Bulb Suction, Stimulation             Beemer Measurements      Birth Weight: 3120 g   Birth Length: 49.5 cm     Head Circumference: 33.5 cm              Skin to Skin      Skin to Skin Initiation Date/Time: 25 22:26:00 CST     Skin to Skin With: Mother